# Patient Record
Sex: FEMALE | Race: WHITE | NOT HISPANIC OR LATINO | Employment: UNEMPLOYED | URBAN - METROPOLITAN AREA
[De-identification: names, ages, dates, MRNs, and addresses within clinical notes are randomized per-mention and may not be internally consistent; named-entity substitution may affect disease eponyms.]

---

## 2023-05-27 LAB
ANION GAP IN SER/PLAS: 13 MMOL/L (ref 10–20)
BASOPHILS (10*3/UL) IN BLOOD BY AUTOMATED COUNT: 0.04 X10E9/L (ref 0–0.1)
BASOPHILS/100 LEUKOCYTES IN BLOOD BY AUTOMATED COUNT: 0.5 % (ref 0–2)
CALCIUM (MG/DL) IN SER/PLAS: 9.5 MG/DL (ref 8.6–10.3)
CARBON DIOXIDE, TOTAL (MMOL/L) IN SER/PLAS: 27 MMOL/L (ref 21–32)
CHLORIDE (MMOL/L) IN SER/PLAS: 102 MMOL/L (ref 98–107)
CHOLESTEROL (MG/DL) IN SER/PLAS: 183 MG/DL (ref 0–199)
CHOLESTEROL IN HDL (MG/DL) IN SER/PLAS: 50.6 MG/DL
CHOLESTEROL/HDL RATIO: 3.6
CREATININE (MG/DL) IN SER/PLAS: 0.92 MG/DL (ref 0.5–1.05)
EOSINOPHILS (10*3/UL) IN BLOOD BY AUTOMATED COUNT: 0.13 X10E9/L (ref 0–0.7)
EOSINOPHILS/100 LEUKOCYTES IN BLOOD BY AUTOMATED COUNT: 1.6 % (ref 0–6)
ERYTHROCYTE DISTRIBUTION WIDTH (RATIO) BY AUTOMATED COUNT: 13.1 % (ref 11.5–14.5)
ERYTHROCYTE MEAN CORPUSCULAR HEMOGLOBIN CONCENTRATION (G/DL) BY AUTOMATED: 31.6 G/DL (ref 32–36)
ERYTHROCYTE MEAN CORPUSCULAR VOLUME (FL) BY AUTOMATED COUNT: 85 FL (ref 80–100)
ERYTHROCYTES (10*6/UL) IN BLOOD BY AUTOMATED COUNT: 5.03 X10E12/L (ref 4–5.2)
GFR FEMALE: 68 ML/MIN/1.73M2
GLUCOSE (MG/DL) IN SER/PLAS: 91 MG/DL (ref 74–99)
HEMATOCRIT (%) IN BLOOD BY AUTOMATED COUNT: 43 % (ref 36–46)
HEMOGLOBIN (G/DL) IN BLOOD: 13.6 G/DL (ref 12–16)
IMMATURE GRANULOCYTES/100 LEUKOCYTES IN BLOOD BY AUTOMATED COUNT: 0.3 % (ref 0–0.9)
LDL: 116 MG/DL (ref 0–99)
LEUKOCYTES (10*3/UL) IN BLOOD BY AUTOMATED COUNT: 7.9 X10E9/L (ref 4.4–11.3)
LYMPHOCYTES (10*3/UL) IN BLOOD BY AUTOMATED COUNT: 2.15 X10E9/L (ref 1.2–4.8)
LYMPHOCYTES/100 LEUKOCYTES IN BLOOD BY AUTOMATED COUNT: 27.3 % (ref 13–44)
MONOCYTES (10*3/UL) IN BLOOD BY AUTOMATED COUNT: 0.41 X10E9/L (ref 0.1–1)
MONOCYTES/100 LEUKOCYTES IN BLOOD BY AUTOMATED COUNT: 5.2 % (ref 2–10)
NEUTROPHILS (10*3/UL) IN BLOOD BY AUTOMATED COUNT: 5.13 X10E9/L (ref 1.2–7.7)
NEUTROPHILS/100 LEUKOCYTES IN BLOOD BY AUTOMATED COUNT: 65.1 % (ref 40–80)
NRBC (PER 100 WBCS) BY AUTOMATED COUNT: 0 /100 WBC (ref 0–0)
PLATELETS (10*3/UL) IN BLOOD AUTOMATED COUNT: 345 X10E9/L (ref 150–450)
POTASSIUM (MMOL/L) IN SER/PLAS: 4.4 MMOL/L (ref 3.5–5.3)
SODIUM (MMOL/L) IN SER/PLAS: 138 MMOL/L (ref 136–145)
TRIGLYCERIDE (MG/DL) IN SER/PLAS: 83 MG/DL (ref 0–149)
UREA NITROGEN (MG/DL) IN SER/PLAS: 8 MG/DL (ref 6–23)
VLDL: 17 MG/DL (ref 0–40)

## 2024-05-29 ENCOUNTER — HOSPITAL ENCOUNTER (OUTPATIENT)
Dept: RADIOLOGY | Facility: CLINIC | Age: 69
End: 2024-05-29
Payer: MEDICAID

## 2024-05-30 ENCOUNTER — HOSPITAL ENCOUNTER (OUTPATIENT)
Dept: RADIOLOGY | Facility: CLINIC | Age: 69
Discharge: HOME | End: 2024-05-30
Payer: COMMERCIAL

## 2024-05-30 VITALS — BODY MASS INDEX: 40.97 KG/M2 | HEIGHT: 64 IN | WEIGHT: 240 LBS

## 2024-05-30 DIAGNOSIS — Z12.31 ENCOUNTER FOR SCREENING MAMMOGRAM FOR MALIGNANT NEOPLASM OF BREAST: ICD-10-CM

## 2024-05-30 PROCEDURE — 77067 SCR MAMMO BI INCL CAD: CPT | Performed by: RADIOLOGY

## 2024-05-30 PROCEDURE — 77063 BREAST TOMOSYNTHESIS BI: CPT | Performed by: RADIOLOGY

## 2024-05-30 PROCEDURE — 77067 SCR MAMMO BI INCL CAD: CPT

## 2024-09-20 ENCOUNTER — PREP FOR PROCEDURE (OUTPATIENT)
Dept: SURGERY | Facility: HOSPITAL | Age: 69
End: 2024-09-20
Payer: COMMERCIAL

## 2024-09-20 DIAGNOSIS — M17.10 ARTHRITIS OF KNEE: Primary | ICD-10-CM

## 2024-09-20 RX ORDER — SODIUM CHLORIDE, SODIUM LACTATE, POTASSIUM CHLORIDE, CALCIUM CHLORIDE 600; 310; 30; 20 MG/100ML; MG/100ML; MG/100ML; MG/100ML
20 INJECTION, SOLUTION INTRAVENOUS CONTINUOUS
OUTPATIENT
Start: 2024-09-20

## 2024-09-25 ENCOUNTER — PRE-ADMISSION TESTING (OUTPATIENT)
Dept: PREADMISSION TESTING | Facility: HOSPITAL | Age: 69
End: 2024-09-25
Payer: COMMERCIAL

## 2024-09-25 VITALS
HEIGHT: 64 IN | SYSTOLIC BLOOD PRESSURE: 160 MMHG | BODY MASS INDEX: 41.4 KG/M2 | DIASTOLIC BLOOD PRESSURE: 77 MMHG | OXYGEN SATURATION: 97 % | RESPIRATION RATE: 18 BRPM | TEMPERATURE: 95 F | WEIGHT: 242.51 LBS | HEART RATE: 67 BPM

## 2024-09-25 DIAGNOSIS — M17.10 ARTHRITIS OF KNEE: ICD-10-CM

## 2024-09-25 DIAGNOSIS — Z01.818 PREOP TESTING: Primary | ICD-10-CM

## 2024-09-25 LAB
ABO GROUP (TYPE) IN BLOOD: NORMAL
ABO GROUP (TYPE) IN BLOOD: NORMAL
ALBUMIN SERPL BCP-MCNC: 4.2 G/DL (ref 3.4–5)
ALP SERPL-CCNC: 80 U/L (ref 33–136)
ALT SERPL W P-5'-P-CCNC: 13 U/L (ref 7–45)
ANION GAP SERPL CALC-SCNC: 11 MMOL/L (ref 10–20)
ANTIBODY SCREEN: NORMAL
APTT PPP: 35 SECONDS (ref 27–38)
AST SERPL W P-5'-P-CCNC: 16 U/L (ref 9–39)
BILIRUB SERPL-MCNC: 0.9 MG/DL (ref 0–1.2)
BUN SERPL-MCNC: 13 MG/DL (ref 6–23)
CALCIUM SERPL-MCNC: 9.8 MG/DL (ref 8.6–10.3)
CHLORIDE SERPL-SCNC: 104 MMOL/L (ref 98–107)
CO2 SERPL-SCNC: 29 MMOL/L (ref 21–32)
CREAT SERPL-MCNC: 0.89 MG/DL (ref 0.5–1.05)
EGFRCR SERPLBLD CKD-EPI 2021: 71 ML/MIN/1.73M*2
ERYTHROCYTE [DISTWIDTH] IN BLOOD BY AUTOMATED COUNT: 13.1 % (ref 11.5–14.5)
GLUCOSE SERPL-MCNC: 103 MG/DL (ref 74–99)
HCT VFR BLD AUTO: 41.8 % (ref 36–46)
HGB BLD-MCNC: 13.5 G/DL (ref 12–16)
INR PPP: 0.9 (ref 0.9–1.1)
MCH RBC QN AUTO: 28.1 PG (ref 26–34)
MCHC RBC AUTO-ENTMCNC: 32.3 G/DL (ref 32–36)
MCV RBC AUTO: 87 FL (ref 80–100)
NRBC BLD-RTO: 0 /100 WBCS (ref 0–0)
PLATELET # BLD AUTO: 316 X10*3/UL (ref 150–450)
POTASSIUM SERPL-SCNC: 5.2 MMOL/L (ref 3.5–5.3)
PROT SERPL-MCNC: 7.1 G/DL (ref 6.4–8.2)
PROTHROMBIN TIME: 10.3 SECONDS (ref 9.8–12.8)
RBC # BLD AUTO: 4.8 X10*6/UL (ref 4–5.2)
RH FACTOR (ANTIGEN D): NORMAL
RH FACTOR (ANTIGEN D): NORMAL
SODIUM SERPL-SCNC: 139 MMOL/L (ref 136–145)
WBC # BLD AUTO: 7.8 X10*3/UL (ref 4.4–11.3)

## 2024-09-25 PROCEDURE — 93005 ELECTROCARDIOGRAM TRACING: CPT

## 2024-09-25 PROCEDURE — 99204 OFFICE O/P NEW MOD 45 MIN: CPT | Performed by: NURSE PRACTITIONER

## 2024-09-25 PROCEDURE — 36415 COLL VENOUS BLD VENIPUNCTURE: CPT

## 2024-09-25 PROCEDURE — 87081 CULTURE SCREEN ONLY: CPT | Mod: PARLAB | Performed by: NURSE PRACTITIONER

## 2024-09-25 PROCEDURE — 85610 PROTHROMBIN TIME: CPT

## 2024-09-25 PROCEDURE — 86901 BLOOD TYPING SEROLOGIC RH(D): CPT | Performed by: INTERNAL MEDICINE

## 2024-09-25 PROCEDURE — 85027 COMPLETE CBC AUTOMATED: CPT

## 2024-09-25 PROCEDURE — 93010 ELECTROCARDIOGRAM REPORT: CPT | Performed by: INTERNAL MEDICINE

## 2024-09-25 PROCEDURE — 80053 COMPREHEN METABOLIC PANEL: CPT

## 2024-09-25 RX ORDER — MECLIZINE HYDROCHLORIDE 25 MG/1
25 TABLET ORAL 3 TIMES DAILY PRN
COMMUNITY

## 2024-09-25 RX ORDER — CHLORHEXIDINE GLUCONATE ORAL RINSE 1.2 MG/ML
15 SOLUTION DENTAL AS NEEDED
Qty: 120 ML | Refills: 0 | Status: SHIPPED | OUTPATIENT
Start: 2024-09-25

## 2024-09-25 RX ORDER — CHLORHEXIDINE GLUCONATE 40 MG/ML
SOLUTION TOPICAL DAILY
Qty: 118 ML | Refills: 0 | Status: SHIPPED | OUTPATIENT
Start: 2024-09-25 | End: 2024-09-30

## 2024-09-25 RX ORDER — CALCITRIOL 0.25 UG/1
0.25 CAPSULE ORAL DAILY
COMMUNITY

## 2024-09-25 ASSESSMENT — DUKE ACTIVITY SCORE INDEX (DASI)
CAN YOU DO MODERATE WORK AROUND THE HOUSE LIKE VACUUMING, SWEEPING FLOORS OR CARRYING GROCERIES: YES
CAN YOU TAKE CARE OF YOURSELF (EAT, DRESS, BATHE, OR USE TOILET): YES
CAN YOU PARTICIPATE IN STRENOUS SPORTS LIKE SWIMMING, SINGLES TENNIS, FOOTBALL, BASKETBALL, OR SKIING: NO
CAN YOU CLIMB A FLIGHT OF STAIRS OR WALK UP A HILL: YES
CAN YOU DO HEAVY WORK AROUND THE HOUSE LIKE SCRUBBING FLOORS OR LIFTING AND MOVING HEAVY FURNITURE: NO
CAN YOU DO YARD WORK LIKE RAKING LEAVES, WEEDING OR PUSHING A MOWER: NO
CAN YOU HAVE SEXUAL RELATIONS: NO
CAN YOU WALK INDOORS, SUCH AS AROUND YOUR HOUSE: YES
CAN YOU PARTICIPATE IN MODERATE RECREATIONAL ACTIVITIES LIKE GOLF, BOWLING, DANCING, DOUBLES TENNIS OR THROWING A BASEBALL OR FOOTBALL: NO
CAN YOU DO LIGHT WORK AROUND THE HOUSE LIKE DUSTING OR WASHING DISHES: YES

## 2024-09-25 NOTE — PREPROCEDURE INSTRUCTIONS
Medication List            Accurate as of September 25, 2024  8:21 AM. Always use your most recent med list.                calcitriol 0.25 mcg capsule  Commonly known as: Rocaltrol  Additional Medication Adjustments for Surgery: Take last dose 7 days before surgery  Notes to patient: Do not take after 9/30     calcium citrate-vitamin D2 250 mg-2.5 mcg (100 unit) tablet  Additional Medication Adjustments for Surgery: Take last dose 7 days before surgery  Notes to patient: Do not take after 9/30     meclizine 25 mg tablet  Commonly known as: Antivert  Medication Adjustments for Surgery: Take/Use as prescribed            PRE-OPERATIVE INSTRUCTIONS FOR SURGERY    *Do not eat anything after midnight the night of surgery.  This includes food of any kind (including hard candy, cough drops, mints).   You may have up to 13.5 ounces of clear liquid  until TWO hours prior to your arrival time to the hospital unless ERAS* protocol is ordered for you.  Clear liquids include water, black tea/coffee, (no milk or cream) apple juice and electrolyte drinks (GATORADE).  You may chew gum until TWO hours prior you your surgery/procedure.     *ERAS protocol: follow as instructed.  DO not drink an additional 13.5 ounces as noted above.        *One of our staff members will call you ONE business day before your surgery, between 11 am-2 pm to let you know the time to arrive.  If you have not received a call by 2 pm, call [phone]  *When you arrive at the hospital-->GO TO Registration on the ground floor  *Stop smoking 24 hours prior to surgery.  No Marijuana, CBD Oil or Vaping for 48 hours  *No alcohol 24 hours prior to surgery  *You will need a responsible adult to drive you home  -No acrylic nails or nail polish on at least one fingernail, NO polish on toes for foot surgery  -You may be asked to remove your dentures, partial plate, eyeglasses or contact lenses before going to surgery.  Please bring a case for these items.  -Body  piercings need to be removed.  Jewelry and valuables should be left at home.  -Put on loose,  comfortable, clean clothing, that will accommodate bandages        What is a home antibacterial shower?  START SHOWERS 10/4  This shower is a way of cleaning the skin with a germ killing solution before surgery.  The solution contains chlorhexidine, commonly known as CHG.  CHG is a skin cleanser with germ killing ability.  Let your doctor know if you are allergic to chlorhexidine.    Why do I need to take a preoperative antibacterial shower?  Skin is not sterile.  It is best to try to make your skin as free of germs as possible before surgery.  Proper cleansing with a germ killing soap before surgery can lower the number of germs on your skin.  This helps to reduce the risk of infection at the surgical site.  Following the instructions listed below will help you prepare your skin for surgery.      How do I use the solution?    Steps: Begin using your CHG soap 5 days before your surgery on _10/8/2024_________________.    *First, wash and rinse your hair using the CHG soap.  Keep CHG soap away from ear canals and eyes.   Rinse completely, do not condition.  Hair extensions should be removed.    *Wash your face with your normal soap and rinse.   *Apply the CHG solution to a clean wet washcloth.  Turn the water off or move away from the water spray to avoid premature rinsing of the CHG soap as you are applying.  Firmly lather your entire body from the neck down.  Do not use on your face.    *Pay special attention to the area(s) where your incision(s) will be located unless they are on your face.  Avoid scrubbing your skin too hard.  The important part is to have the CHG soap sit on your skin for 3 minutes.   *When the 3 minutes are up, turn on the water and rinse the CHG solution off your body completely.  *Do not wash with regular soap after you  have  used the CHG soap solution.  *Pat  yourself dry with a clean, freshly  laundered towel.  *Do not apply powders, deodorants or lotions.  *Dress in clean freshly laundered night clothes.    *Be sure to sleep with clean freshly laundered sheets.    *Be aware the CHG will cause stains on fabrics; if you wash them with bleach after use.  Rinse your washcloth and other linens that have contact with CHG completely.  Use only non-chlorine detergents to launder the items  used.  *The morning of surgery is the fifth day.  Repeat the above steps and dress in clean comfortable clothing.     What is oral/dental rinse?  It is mouthwash.  It is a way of cleaning the he mouth with a germ-killing solution before your surgery.  The solution contains chlorhexidine, commonly known as CHG.  It is used inside the mouth to kill a bacteria known as Staphylococcus aureus.  Let your doctor know if you are allergic to Chlorhexidine.    Why do I need to use CHG oral/ dental rinse?  The CHG oral/dental rinse helps to kill bacteria in your mouth know as Staphylococcus aureus.  This reduces the risk of infection at the surgical site.    Using your CHG oral/dental rinse    STEPS:    Use your CHG oral/dental rinse after you brush your teeth the night before (at bedtime) and the morning of your surgery.  Follow all the directions on your prescription label.  *Use the cap on the container to measure 15 ml  *Swish (gargle if you can) the mouthwash in your mouth for at least 30 seconds, (do not swallow) and spit out  *After you use your CHG rinse, do not rinse your mouth with water, drink or eat.  Please refer to the prescription label for the appropriate time to resume oral intake.    What side effects might I have using the CHG oral/dental rinse?  CHG rinse will stick you plaque on the teeth.  Brush and floss just before use.   Teeth brushing will help avoid staining of the plaque during  use.  Teeth brushing will help avoid staining of plaque during  use.    Who should I contact if I have questions about the CHG  oral/dental rinse and or CHG soap?  Please call [facility], Pre-Admission testing at [phone] if you have any questions.    What you may be asked to bring to surgery:  ___Crutches, walker  ___CPAP machine  ___Urine specimen

## 2024-09-25 NOTE — H&P (VIEW-ONLY)
"CPM/PAT Evaluation       Name: Diane Fleischer (Diane Fleischer)  /Age: 1955/ y.o.     In-Person       Chief Complaint:     68 yr old female presents to PAT for pre-operative evaluation, with c/o knee pain  RIGHT TOTAL KNEE REPLACEMENT is Scheduled on 10/8/2024 with Dr. Zamora    The patient has the following past medical history:   RA, thyroid disease, BPPV    Chief complaint:  She c/o right knee pain d/t arthritis, She states the \"right knee gives and left pops\"  She reports that she fell at work in , and had an arthroscopy to her right knee in the past  States she re-injured her right knee in  d/t another fall at work causing worsening pain and chronic swelling.   She takes tylenol for pain, as needed    PCP - Dr. Bourgeois, LOV 2024  She reports \"he is keeping an eye on her thyroid levels.\"    She c/o vertigo d/t a pinched nerve in her neck.  Takes medication and last taken 3 months ago    Denies fever, chills or nausea.   Denies any past issues with anesthesia.        Vitals:    24 0744   BP: 160/77   Pulse: 67   Resp: 18   Temp: 35 °C (95 °F)   SpO2: 97%          Past Medical History:   Diagnosis Date    Arthritis     Disease of thyroid gland        No past surgical history on file.    Past Surgical History   Procedure Laterality Date    Arthroplasty, total knee, revision     Carpal tunnel surgery.     Patient  has no history on file for sexual activity.    Family History   Problem Relation Name Age of Onset    Breast cancer Other Mat Aunt        No Known Allergies    Prior to Admission medications    Medication Sig Start Date End Date Taking? Authorizing Provider   calcitriol (Rocaltrol) 0.25 mcg capsule Take 1 capsule (0.25 mcg) by mouth once daily.   Yes Historical Provider, MD   calcium citrate-vitamin D2 250 mg-2.5 mcg (100 unit) tablet Take 1 tablet by mouth 2 times a day.   Yes Historical Provider, MD   meclizine (Antivert) 25 mg tablet Take 1 tablet (25 mg) by mouth 3 times a " day as needed for dizziness.    Historical Provider, MD      Review of Systems  Constitutional: NO F, chills, or sweats  Eyes: glasses, no blurred vision or visual disturbance  ENT: denies congestion, sore throat, difficulty hearing  Cardiovascular: no chest pain, no edema, no palps and no syncope.   Respiratory: COLÓN, sleeps propped up, no cough,no s.o.b. and no wheezing  Gastrointestinal: no abdominal pain, no N/V, no blood in stools  Genitourinary: LMP- in her 40's, urge/stress incontinence, nocturia, no dysuria, no urinary frequency, no urinary hesitancy and no feelings of urinary urgency.   Musculoskeletal: see HPI, no arthralgias,  no back pain and no myalgias.   Integumentary: no new skin lesions and no rashes.   Neurological: + difficulty walking, no headache, no limb weakness, no numbness and no tingling.   Endocrine: no recent weight gain and no recent weight loss.   Hematologic/Lymphatic: no tendency for easy bruising and no swollen glands.      Physical Exam  Constitutional:       Appearance: Normal appearance.   Cardiovascular:      Rate and Rhythm: Normal rate.      Heart sounds: Normal heart sounds.   Pulmonary:      Effort: Pulmonary effort is normal.      Breath sounds: Normal breath sounds.   Abdominal:      General: Bowel sounds are normal.      Palpations: Abdomen is soft.   Musculoskeletal:      Cervical back: Normal range of motion.      Right knee: Bony tenderness present. Decreased range of motion.      Right lower leg: Edema present.      Left lower leg: Edema present.      Comments: + antalgic gait   Skin:     General: Skin is warm and dry.   Neurological:      Mental Status: She is alert and oriented to person, place, and time.          PAT AIRWAY:   Airway:     Mallampati::  III    TM distance::  <3 FB   Partial for upper front,  Poor dentition   partials      Visit Vitals  /77   Pulse 67   Temp 35 °C (95 °F) (Temporal)   Resp 18       DASI Risk Score    No data to display        Caprini DVT Assessment    No data to display       Modified Frailty Index    No data to display       CHADS2 Stroke Risk  Current as of 16 minutes ago        N/A 3 to 100%: High Risk   2 to < 3%: Medium Risk   0 to < 2%: Low Risk     Last Change: N/A          This score determines the patient's risk of having a stroke if the patient has atrial fibrillation.        This score is not applicable to this patient. Components are not calculated.          Revised Cardiac Risk Index    No data to display       Apfel Simplified Score    No data to display       Risk Analysis Index Results This Encounter    No data found in the last 1 encounters.       Stop Bang Score      Flowsheet Row Most Recent Value   Do you snore loudly? 0   Do you often feel tired or fatigued after your sleep? 0   Has anyone ever observed you stop breathing in your sleep? 0   Do you have or are you being treated for high blood pressure? 0   Recent BMI (Calculated) 41.6   Is BMI greater than 35 kg/m2? 1=Yes   Age older than 50 years old? 1=Yes   Is your neck circumference greater than 17 inches (Male) or 16 inches (Female)? 1   Gender - Male 0=No   STOP-BANG Total Score 3            ASSESSMENT  Obesity BMI 41.63    BPPV-  Followed Neuro at [facility] in the past  Takes Meclizine PRN, last taken 3 months ago    RA-  Takes Tylenol PRN  Plan for RTK with Dr. Zamora      ECG 9/25/2024- NSR, 60 bpm           ANESTHESIA FINDINGS:  Intubation History: No history of difficult intubation  Significant Anesthesia Considerations:      Airway History: No abnormal airway history  Predictors of Difficult Airway Management  ? Obesity        CONSULTS:    Patient does not require consults for optimization at this time.     The Following Tests/Procedures Have Been Initiated and Reviewed/ interpreted by me:   CBC, BMP, Coag screen, MRSA screen, ECG     Planned Anesthetic: general     Instructions Given to Patient:  *Reviewed Medications to be taken in AM of surgery or  held  Patient given verbal and written preop instructions and voices comprehension and compliance.     No further testing required.      PLAN  This patient is optimally prepared for surgery.

## 2024-09-26 LAB
ATRIAL RATE: 60 BPM
P AXIS: 40 DEGREES
P OFFSET: 200 MS
P ONSET: 149 MS
PR INTERVAL: 150 MS
Q ONSET: 224 MS
QRS COUNT: 10 BEATS
QRS DURATION: 74 MS
QT INTERVAL: 410 MS
QTC CALCULATION(BAZETT): 410 MS
QTC FREDERICIA: 410 MS
R AXIS: -23 DEGREES
STAPHYLOCOCCUS SPEC CULT: ABNORMAL
T AXIS: 20 DEGREES
T OFFSET: 429 MS
VENTRICULAR RATE: 60 BPM

## 2024-10-08 ENCOUNTER — ANESTHESIA (OUTPATIENT)
Dept: OPERATING ROOM | Facility: HOSPITAL | Age: 69
End: 2024-10-08
Payer: COMMERCIAL

## 2024-10-08 ENCOUNTER — ANESTHESIA EVENT (OUTPATIENT)
Dept: OPERATING ROOM | Facility: HOSPITAL | Age: 69
End: 2024-10-08
Payer: COMMERCIAL

## 2024-10-08 ENCOUNTER — HOSPITAL ENCOUNTER (OUTPATIENT)
Facility: HOSPITAL | Age: 69
LOS: 1 days | Discharge: HOME HEALTH | End: 2024-10-09
Attending: ORTHOPAEDIC SURGERY | Admitting: ORTHOPAEDIC SURGERY
Payer: COMMERCIAL

## 2024-10-08 DIAGNOSIS — M17.10 ARTHRITIS OF KNEE: Primary | ICD-10-CM

## 2024-10-08 DIAGNOSIS — M17.11 ARTHRITIS OF RIGHT KNEE: ICD-10-CM

## 2024-10-08 DIAGNOSIS — Z01.818 PRE-OP TESTING: ICD-10-CM

## 2024-10-08 PROCEDURE — 2500000001 HC RX 250 WO HCPCS SELF ADMINISTERED DRUGS (ALT 637 FOR MEDICARE OP): Performed by: ANESTHESIOLOGY

## 2024-10-08 PROCEDURE — 7100000011 HC EXTENDED STAY RECOVERY HOURLY - NURSING UNIT

## 2024-10-08 PROCEDURE — 87081 CULTURE SCREEN ONLY: CPT | Mod: PARLAB | Performed by: NURSE PRACTITIONER

## 2024-10-08 PROCEDURE — 2500000004 HC RX 250 GENERAL PHARMACY W/ HCPCS (ALT 636 FOR OP/ED): Performed by: ORTHOPAEDIC SURGERY

## 2024-10-08 PROCEDURE — 3700000002 HC GENERAL ANESTHESIA TIME - EACH INCREMENTAL 1 MINUTE: Performed by: ORTHOPAEDIC SURGERY

## 2024-10-08 PROCEDURE — 88311 DECALCIFY TISSUE: CPT | Mod: TC,PARLAB | Performed by: ORTHOPAEDIC SURGERY

## 2024-10-08 PROCEDURE — 2500000005 HC RX 250 GENERAL PHARMACY W/O HCPCS: Performed by: ANESTHESIOLOGIST ASSISTANT

## 2024-10-08 PROCEDURE — 2500000005 HC RX 250 GENERAL PHARMACY W/O HCPCS: Performed by: INTERNAL MEDICINE

## 2024-10-08 PROCEDURE — 2720000007 HC OR 272 NO HCPCS: Performed by: ORTHOPAEDIC SURGERY

## 2024-10-08 PROCEDURE — 97165 OT EVAL LOW COMPLEX 30 MIN: CPT | Mod: GO

## 2024-10-08 PROCEDURE — 2500000001 HC RX 250 WO HCPCS SELF ADMINISTERED DRUGS (ALT 637 FOR MEDICARE OP): Performed by: ORTHOPAEDIC SURGERY

## 2024-10-08 PROCEDURE — 2780000003 HC OR 278 NO HCPCS: Performed by: ORTHOPAEDIC SURGERY

## 2024-10-08 PROCEDURE — 2500000004 HC RX 250 GENERAL PHARMACY W/ HCPCS (ALT 636 FOR OP/ED): Performed by: ANESTHESIOLOGY

## 2024-10-08 PROCEDURE — 2500000004 HC RX 250 GENERAL PHARMACY W/ HCPCS (ALT 636 FOR OP/ED): Performed by: INTERNAL MEDICINE

## 2024-10-08 PROCEDURE — 2500000004 HC RX 250 GENERAL PHARMACY W/ HCPCS (ALT 636 FOR OP/ED): Performed by: ANESTHESIOLOGIST ASSISTANT

## 2024-10-08 PROCEDURE — 97161 PT EVAL LOW COMPLEX 20 MIN: CPT | Mod: GP

## 2024-10-08 PROCEDURE — 3700000001 HC GENERAL ANESTHESIA TIME - INITIAL BASE CHARGE: Performed by: ORTHOPAEDIC SURGERY

## 2024-10-08 PROCEDURE — C1713 ANCHOR/SCREW BN/BN,TIS/BN: HCPCS | Performed by: ORTHOPAEDIC SURGERY

## 2024-10-08 PROCEDURE — 3600000018 HC OR TIME - INITIAL BASE CHARGE - PROCEDURE LEVEL SIX: Performed by: ORTHOPAEDIC SURGERY

## 2024-10-08 PROCEDURE — 3600000017 HC OR TIME - EACH INCREMENTAL 1 MINUTE - PROCEDURE LEVEL SIX: Performed by: ORTHOPAEDIC SURGERY

## 2024-10-08 PROCEDURE — 2500000005 HC RX 250 GENERAL PHARMACY W/O HCPCS: Performed by: ORTHOPAEDIC SURGERY

## 2024-10-08 PROCEDURE — C1776 JOINT DEVICE (IMPLANTABLE): HCPCS | Performed by: ORTHOPAEDIC SURGERY

## 2024-10-08 PROCEDURE — 7100000001 HC RECOVERY ROOM TIME - INITIAL BASE CHARGE: Performed by: ORTHOPAEDIC SURGERY

## 2024-10-08 PROCEDURE — 7100000002 HC RECOVERY ROOM TIME - EACH INCREMENTAL 1 MINUTE: Performed by: ORTHOPAEDIC SURGERY

## 2024-10-08 DEVICE — P.F.C. SIGMA TIBIAL TRAY FIXED BEARING MODULAR COCR 3 CEMENTED
Type: IMPLANTABLE DEVICE | Site: KNEE | Status: FUNCTIONAL
Brand: P.F.C. SIGMA

## 2024-10-08 DEVICE — P.F.C. SIGMA OVAL DOME PATELLA 3-PEG 35MM CEMENTED
Type: IMPLANTABLE DEVICE | Site: KNEE | Status: FUNCTIONAL
Brand: P.F.C. SIGMA

## 2024-10-08 DEVICE — SIGMA FEMORAL POSTERIOR STABILIZED CEMENTED SIZE 3 RIGHT
Type: IMPLANTABLE DEVICE | Site: KNEE | Status: FUNCTIONAL
Brand: SIGMA

## 2024-10-08 DEVICE — P.F.C. SIGMA TIBIAL INSERT FIXED BEARING STABILIZED 3 10MM XLK
Type: IMPLANTABLE DEVICE | Site: KNEE | Status: FUNCTIONAL
Brand: P.F.C. SIGMA

## 2024-10-08 RX ORDER — CEFAZOLIN SODIUM 2 G/100ML
2 INJECTION, SOLUTION INTRAVENOUS EVERY 8 HOURS
Status: COMPLETED | OUTPATIENT
Start: 2024-10-08 | End: 2024-10-09

## 2024-10-08 RX ORDER — ACETAMINOPHEN 325 MG/1
650 TABLET ORAL ONCE
Status: COMPLETED | OUTPATIENT
Start: 2024-10-08 | End: 2024-10-08

## 2024-10-08 RX ORDER — CEFAZOLIN SODIUM 2 G/100ML
2 INJECTION, SOLUTION INTRAVENOUS EVERY 8 HOURS
Status: DISCONTINUED | OUTPATIENT
Start: 2024-10-08 | End: 2024-10-08 | Stop reason: HOSPADM

## 2024-10-08 RX ORDER — MECLIZINE HYDROCHLORIDE 25 MG/1
25 TABLET ORAL 3 TIMES DAILY PRN
Status: DISCONTINUED | OUTPATIENT
Start: 2024-10-08 | End: 2024-10-09 | Stop reason: HOSPADM

## 2024-10-08 RX ORDER — HYDROMORPHONE HYDROCHLORIDE 1 MG/ML
1 INJECTION, SOLUTION INTRAMUSCULAR; INTRAVENOUS; SUBCUTANEOUS EVERY 5 MIN PRN
Status: DISCONTINUED | OUTPATIENT
Start: 2024-10-08 | End: 2024-10-08 | Stop reason: HOSPADM

## 2024-10-08 RX ORDER — KETOROLAC TROMETHAMINE 30 MG/ML
15 INJECTION, SOLUTION INTRAMUSCULAR; INTRAVENOUS EVERY 8 HOURS
Status: CANCELLED | OUTPATIENT
Start: 2024-10-08 | End: 2024-10-09

## 2024-10-08 RX ORDER — CELECOXIB 100 MG/1
100 CAPSULE ORAL ONCE
Status: COMPLETED | OUTPATIENT
Start: 2024-10-08 | End: 2024-10-08

## 2024-10-08 RX ORDER — ONDANSETRON 4 MG/1
4 TABLET, FILM COATED ORAL EVERY 8 HOURS PRN
Status: DISCONTINUED | OUTPATIENT
Start: 2024-10-08 | End: 2024-10-09 | Stop reason: HOSPADM

## 2024-10-08 RX ORDER — ACETAMINOPHEN 325 MG/1
650 TABLET ORAL EVERY 4 HOURS PRN
Status: DISCONTINUED | OUTPATIENT
Start: 2024-10-08 | End: 2024-10-08 | Stop reason: HOSPADM

## 2024-10-08 RX ORDER — PROPOFOL 10 MG/ML
INJECTION, EMULSION INTRAVENOUS CONTINUOUS PRN
Status: DISCONTINUED | OUTPATIENT
Start: 2024-10-08 | End: 2024-10-08

## 2024-10-08 RX ORDER — SODIUM CHLORIDE, SODIUM LACTATE, POTASSIUM CHLORIDE, CALCIUM CHLORIDE 600; 310; 30; 20 MG/100ML; MG/100ML; MG/100ML; MG/100ML
75 INJECTION, SOLUTION INTRAVENOUS CONTINUOUS
Status: ACTIVE | OUTPATIENT
Start: 2024-10-08 | End: 2024-10-09

## 2024-10-08 RX ORDER — TRANEXAMIC ACID 10 MG/ML
1000 INJECTION, SOLUTION INTRAVENOUS ONCE
Status: DISCONTINUED | OUTPATIENT
Start: 2024-10-08 | End: 2024-10-08 | Stop reason: HOSPADM

## 2024-10-08 RX ORDER — LABETALOL HYDROCHLORIDE 5 MG/ML
5 INJECTION, SOLUTION INTRAVENOUS ONCE AS NEEDED
Status: DISCONTINUED | OUTPATIENT
Start: 2024-10-08 | End: 2024-10-08 | Stop reason: HOSPADM

## 2024-10-08 RX ORDER — PHENYLEPHRINE HCL IN 0.9% NACL 1 MG/10 ML
SYRINGE (ML) INTRAVENOUS AS NEEDED
Status: DISCONTINUED | OUTPATIENT
Start: 2024-10-08 | End: 2024-10-08

## 2024-10-08 RX ORDER — FENTANYL CITRATE 50 UG/ML
INJECTION, SOLUTION INTRAMUSCULAR; INTRAVENOUS AS NEEDED
Status: DISCONTINUED | OUTPATIENT
Start: 2024-10-08 | End: 2024-10-08

## 2024-10-08 RX ORDER — LIDOCAINE HCL/PF 100 MG/5ML
SYRINGE (ML) INTRAVENOUS AS NEEDED
Status: DISCONTINUED | OUTPATIENT
Start: 2024-10-08 | End: 2024-10-08

## 2024-10-08 RX ORDER — MEPERIDINE HYDROCHLORIDE 25 MG/ML
12.5 INJECTION INTRAMUSCULAR; INTRAVENOUS; SUBCUTANEOUS EVERY 10 MIN PRN
Status: DISCONTINUED | OUTPATIENT
Start: 2024-10-08 | End: 2024-10-08 | Stop reason: HOSPADM

## 2024-10-08 RX ORDER — OXYCODONE HYDROCHLORIDE 5 MG/1
10 TABLET ORAL EVERY 4 HOURS PRN
Status: DISCONTINUED | OUTPATIENT
Start: 2024-10-08 | End: 2024-10-09 | Stop reason: HOSPADM

## 2024-10-08 RX ORDER — GLYCOPYRROLATE 0.2 MG/ML
INJECTION INTRAMUSCULAR; INTRAVENOUS AS NEEDED
Status: DISCONTINUED | OUTPATIENT
Start: 2024-10-08 | End: 2024-10-08

## 2024-10-08 RX ORDER — ACETAMINOPHEN 325 MG/1
650 TABLET ORAL EVERY 6 HOURS SCHEDULED
Status: DISCONTINUED | OUTPATIENT
Start: 2024-10-08 | End: 2024-10-09 | Stop reason: HOSPADM

## 2024-10-08 RX ORDER — DIPHENHYDRAMINE HYDROCHLORIDE 50 MG/ML
12.5 INJECTION INTRAMUSCULAR; INTRAVENOUS ONCE AS NEEDED
Status: DISCONTINUED | OUTPATIENT
Start: 2024-10-08 | End: 2024-10-08 | Stop reason: HOSPADM

## 2024-10-08 RX ORDER — TRANEXAMIC ACID 10 MG/ML
1000 INJECTION, SOLUTION INTRAVENOUS ONCE
Status: COMPLETED | OUTPATIENT
Start: 2024-10-08 | End: 2024-10-08

## 2024-10-08 RX ORDER — SODIUM CHLORIDE, SODIUM LACTATE, POTASSIUM CHLORIDE, CALCIUM CHLORIDE 600; 310; 30; 20 MG/100ML; MG/100ML; MG/100ML; MG/100ML
20 INJECTION, SOLUTION INTRAVENOUS CONTINUOUS
Status: DISCONTINUED | OUTPATIENT
Start: 2024-10-08 | End: 2024-10-09 | Stop reason: HOSPADM

## 2024-10-08 RX ORDER — SODIUM CHLORIDE, SODIUM LACTATE, POTASSIUM CHLORIDE, CALCIUM CHLORIDE 600; 310; 30; 20 MG/100ML; MG/100ML; MG/100ML; MG/100ML
100 INJECTION, SOLUTION INTRAVENOUS CONTINUOUS
Status: DISCONTINUED | OUTPATIENT
Start: 2024-10-08 | End: 2024-10-09 | Stop reason: HOSPADM

## 2024-10-08 RX ORDER — POLYETHYLENE GLYCOL 3350 17 G/17G
17 POWDER, FOR SOLUTION ORAL DAILY
Status: DISCONTINUED | OUTPATIENT
Start: 2024-10-08 | End: 2024-10-09 | Stop reason: HOSPADM

## 2024-10-08 RX ORDER — BUPIVACAINE HYDROCHLORIDE 7.5 MG/ML
INJECTION, SOLUTION INTRASPINAL AS NEEDED
Status: DISCONTINUED | OUTPATIENT
Start: 2024-10-08 | End: 2024-10-08

## 2024-10-08 RX ORDER — ONDANSETRON HYDROCHLORIDE 2 MG/ML
4 INJECTION, SOLUTION INTRAVENOUS EVERY 8 HOURS PRN
Status: DISCONTINUED | OUTPATIENT
Start: 2024-10-08 | End: 2024-10-09 | Stop reason: HOSPADM

## 2024-10-08 RX ORDER — NALOXONE HYDROCHLORIDE 1 MG/ML
0.2 INJECTION INTRAMUSCULAR; INTRAVENOUS; SUBCUTANEOUS EVERY 5 MIN PRN
Status: DISCONTINUED | OUTPATIENT
Start: 2024-10-08 | End: 2024-10-08 | Stop reason: SDUPTHER

## 2024-10-08 RX ORDER — MIDAZOLAM HYDROCHLORIDE 1 MG/ML
INJECTION, SOLUTION INTRAMUSCULAR; INTRAVENOUS AS NEEDED
Status: DISCONTINUED | OUTPATIENT
Start: 2024-10-08 | End: 2024-10-08

## 2024-10-08 RX ORDER — GABAPENTIN 300 MG/1
300 CAPSULE ORAL ONCE
Status: COMPLETED | OUTPATIENT
Start: 2024-10-08 | End: 2024-10-08

## 2024-10-08 RX ORDER — DEXMEDETOMIDINE HYDROCHLORIDE 100 UG/ML
INJECTION, SOLUTION INTRAVENOUS AS NEEDED
Status: DISCONTINUED | OUTPATIENT
Start: 2024-10-08 | End: 2024-10-08

## 2024-10-08 RX ORDER — NALOXONE HYDROCHLORIDE 1 MG/ML
0.2 INJECTION INTRAMUSCULAR; INTRAVENOUS; SUBCUTANEOUS EVERY 5 MIN PRN
Status: DISCONTINUED | OUTPATIENT
Start: 2024-10-08 | End: 2024-10-09 | Stop reason: HOSPADM

## 2024-10-08 RX ORDER — KETAMINE HCL IN NACL, ISO-OSM 100MG/10ML
SYRINGE (ML) INJECTION AS NEEDED
Status: DISCONTINUED | OUTPATIENT
Start: 2024-10-08 | End: 2024-10-08

## 2024-10-08 RX ORDER — MIDAZOLAM HYDROCHLORIDE 1 MG/ML
1 INJECTION, SOLUTION INTRAMUSCULAR; INTRAVENOUS ONCE AS NEEDED
Status: DISCONTINUED | OUTPATIENT
Start: 2024-10-08 | End: 2024-10-08 | Stop reason: HOSPADM

## 2024-10-08 RX ORDER — SODIUM CHLORIDE 0.9 G/100ML
IRRIGANT IRRIGATION AS NEEDED
Status: DISCONTINUED | OUTPATIENT
Start: 2024-10-08 | End: 2024-10-08 | Stop reason: HOSPADM

## 2024-10-08 RX ORDER — CALCITRIOL 0.25 UG/1
0.25 CAPSULE ORAL DAILY
Status: DISCONTINUED | OUTPATIENT
Start: 2024-10-08 | End: 2024-10-09 | Stop reason: HOSPADM

## 2024-10-08 RX ORDER — MIDAZOLAM HYDROCHLORIDE 1 MG/ML
INJECTION, SOLUTION INTRAMUSCULAR; INTRAVENOUS
Status: COMPLETED
Start: 2024-10-08 | End: 2024-10-08

## 2024-10-08 RX ORDER — MORPHINE SULFATE 4 MG/ML
4 INJECTION INTRAVENOUS EVERY 4 HOURS PRN
Status: DISCONTINUED | OUTPATIENT
Start: 2024-10-08 | End: 2024-10-09 | Stop reason: HOSPADM

## 2024-10-08 RX ORDER — HYDRALAZINE HYDROCHLORIDE 20 MG/ML
5 INJECTION INTRAMUSCULAR; INTRAVENOUS EVERY 30 MIN PRN
Status: DISCONTINUED | OUTPATIENT
Start: 2024-10-08 | End: 2024-10-08 | Stop reason: HOSPADM

## 2024-10-08 RX ORDER — ONDANSETRON HYDROCHLORIDE 2 MG/ML
4 INJECTION, SOLUTION INTRAVENOUS ONCE AS NEEDED
Status: DISCONTINUED | OUTPATIENT
Start: 2024-10-08 | End: 2024-10-08 | Stop reason: HOSPADM

## 2024-10-08 RX ORDER — OXYCODONE HYDROCHLORIDE 5 MG/1
5 TABLET ORAL EVERY 6 HOURS PRN
Status: DISCONTINUED | OUTPATIENT
Start: 2024-10-08 | End: 2024-10-09 | Stop reason: HOSPADM

## 2024-10-08 SDOH — ECONOMIC STABILITY: HOUSING INSECURITY: IN THE PAST 12 MONTHS, HOW MANY TIMES HAVE YOU MOVED WHERE YOU WERE LIVING?: 0

## 2024-10-08 SDOH — SOCIAL STABILITY: SOCIAL INSECURITY: DO YOU FEEL ANYONE HAS EXPLOITED OR TAKEN ADVANTAGE OF YOU FINANCIALLY OR OF YOUR PERSONAL PROPERTY?: NO

## 2024-10-08 SDOH — ECONOMIC STABILITY: HOUSING INSECURITY: IN THE LAST 12 MONTHS, WAS THERE A TIME WHEN YOU WERE NOT ABLE TO PAY THE MORTGAGE OR RENT ON TIME?: NO

## 2024-10-08 SDOH — ECONOMIC STABILITY: FOOD INSECURITY: WITHIN THE PAST 12 MONTHS, THE FOOD YOU BOUGHT JUST DIDN'T LAST AND YOU DIDN'T HAVE MONEY TO GET MORE.: NEVER TRUE

## 2024-10-08 SDOH — ECONOMIC STABILITY: INCOME INSECURITY: IN THE LAST 12 MONTHS, WAS THERE A TIME WHEN YOU WERE NOT ABLE TO PAY THE MORTGAGE OR RENT ON TIME?: NO

## 2024-10-08 SDOH — ECONOMIC STABILITY: FOOD INSECURITY: WITHIN THE PAST 12 MONTHS, YOU WORRIED THAT YOUR FOOD WOULD RUN OUT BEFORE YOU GOT THE MONEY TO BUY MORE.: NEVER TRUE

## 2024-10-08 SDOH — ECONOMIC STABILITY: HOUSING INSECURITY: AT ANY TIME IN THE PAST 12 MONTHS, WERE YOU HOMELESS OR LIVING IN A SHELTER (INCLUDING NOW)?: NO

## 2024-10-08 SDOH — ECONOMIC STABILITY: FOOD INSECURITY: HOW HARD IS IT FOR YOU TO PAY FOR THE VERY BASICS LIKE FOOD, HOUSING, MEDICAL CARE, AND HEATING?: NOT HARD AT ALL

## 2024-10-08 SDOH — ECONOMIC STABILITY: INCOME INSECURITY: IN THE PAST 12 MONTHS HAS THE ELECTRIC, GAS, OIL, OR WATER COMPANY THREATENED TO SHUT OFF SERVICES IN YOUR HOME?: NO

## 2024-10-08 SDOH — ECONOMIC STABILITY: TRANSPORTATION INSECURITY
IN THE PAST 12 MONTHS, HAS LACK OF TRANSPORTATION KEPT YOU FROM MEETINGS, WORK, OR FROM GETTING THINGS NEEDED FOR DAILY LIVING?: NO

## 2024-10-08 SDOH — SOCIAL STABILITY: SOCIAL INSECURITY: HAVE YOU HAD ANY THOUGHTS OF HARMING ANYONE ELSE?: NO

## 2024-10-08 SDOH — ECONOMIC STABILITY: FOOD INSECURITY: WITHIN THE PAST 12 MONTHS, YOU WORRIED THAT YOUR FOOD WOULD RUN OUT BEFORE YOU GOT MONEY TO BUY MORE.: NEVER TRUE

## 2024-10-08 SDOH — ECONOMIC STABILITY: TRANSPORTATION INSECURITY
IN THE PAST 12 MONTHS, HAS THE LACK OF TRANSPORTATION KEPT YOU FROM MEDICAL APPOINTMENTS OR FROM GETTING MEDICATIONS?: NO

## 2024-10-08 SDOH — SOCIAL STABILITY: SOCIAL INSECURITY: ARE YOU OR HAVE YOU BEEN THREATENED OR ABUSED PHYSICALLY, EMOTIONALLY, OR SEXUALLY BY ANYONE?: NO

## 2024-10-08 SDOH — SOCIAL STABILITY: SOCIAL INSECURITY: ARE THERE ANY APPARENT SIGNS OF INJURIES/BEHAVIORS THAT COULD BE RELATED TO ABUSE/NEGLECT?: NO

## 2024-10-08 SDOH — ECONOMIC STABILITY: INCOME INSECURITY: IN THE PAST 12 MONTHS, HAS THE ELECTRIC, GAS, OIL, OR WATER COMPANY THREATENED TO SHUT OFF SERVICE IN YOUR HOME?: NO

## 2024-10-08 SDOH — SOCIAL STABILITY: SOCIAL INSECURITY
WITHIN THE LAST YEAR, HAVE YOU BEEN KICKED, HIT, SLAPPED, OR OTHERWISE PHYSICALLY HURT BY YOUR PARTNER OR EX-PARTNER?: NO

## 2024-10-08 SDOH — SOCIAL STABILITY: SOCIAL INSECURITY
WITHIN THE LAST YEAR, HAVE TO BEEN RAPED OR FORCED TO HAVE ANY KIND OF SEXUAL ACTIVITY BY YOUR PARTNER OR EX-PARTNER?: NO

## 2024-10-08 SDOH — SOCIAL STABILITY: SOCIAL INSECURITY
WITHIN THE LAST YEAR, HAVE YOU BEEN RAPED OR FORCED TO HAVE ANY KIND OF SEXUAL ACTIVITY BY YOUR PARTNER OR EX-PARTNER?: NO

## 2024-10-08 SDOH — SOCIAL STABILITY: SOCIAL INSECURITY: WITHIN THE LAST YEAR, HAVE YOU BEEN AFRAID OF YOUR PARTNER OR EX-PARTNER?: NO

## 2024-10-08 SDOH — SOCIAL STABILITY: SOCIAL INSECURITY: ABUSE: ADULT

## 2024-10-08 SDOH — ECONOMIC STABILITY: INCOME INSECURITY: HOW HARD IS IT FOR YOU TO PAY FOR THE VERY BASICS LIKE FOOD, HOUSING, MEDICAL CARE, AND HEATING?: NOT HARD AT ALL

## 2024-10-08 SDOH — SOCIAL STABILITY: SOCIAL INSECURITY: WITHIN THE LAST YEAR, HAVE YOU BEEN HUMILIATED OR EMOTIONALLY ABUSED IN OTHER WAYS BY YOUR PARTNER OR EX-PARTNER?: NO

## 2024-10-08 SDOH — SOCIAL STABILITY: SOCIAL INSECURITY: WERE YOU ABLE TO COMPLETE ALL THE BEHAVIORAL HEALTH SCREENINGS?: YES

## 2024-10-08 SDOH — SOCIAL STABILITY: SOCIAL INSECURITY: HAVE YOU HAD THOUGHTS OF HARMING ANYONE ELSE?: NO

## 2024-10-08 SDOH — SOCIAL STABILITY: SOCIAL INSECURITY: DO YOU FEEL UNSAFE GOING BACK TO THE PLACE WHERE YOU ARE LIVING?: NO

## 2024-10-08 SDOH — SOCIAL STABILITY: SOCIAL INSECURITY: DOES ANYONE TRY TO KEEP YOU FROM HAVING/CONTACTING OTHER FRIENDS OR DOING THINGS OUTSIDE YOUR HOME?: NO

## 2024-10-08 SDOH — HEALTH STABILITY: MENTAL HEALTH: CURRENT SMOKER: 0

## 2024-10-08 SDOH — ECONOMIC STABILITY: TRANSPORTATION INSECURITY: IN THE PAST 12 MONTHS, HAS LACK OF TRANSPORTATION KEPT YOU FROM MEDICAL APPOINTMENTS OR FROM GETTING MEDICATIONS?: NO

## 2024-10-08 SDOH — SOCIAL STABILITY: SOCIAL INSECURITY: HAS ANYONE EVER THREATENED TO HURT YOUR FAMILY OR YOUR PETS?: NO

## 2024-10-08 ASSESSMENT — PAIN - FUNCTIONAL ASSESSMENT
PAIN_FUNCTIONAL_ASSESSMENT: 0-10

## 2024-10-08 ASSESSMENT — COGNITIVE AND FUNCTIONAL STATUS - GENERAL
WALKING IN HOSPITAL ROOM: A LITTLE
WALKING IN HOSPITAL ROOM: A LITTLE
TOILETING: A LOT
MOVING FROM LYING ON BACK TO SITTING ON SIDE OF FLAT BED WITH BEDRAILS: A LITTLE
DRESSING REGULAR UPPER BODY CLOTHING: A LITTLE
MOBILITY SCORE: 10
DAILY ACTIVITIY SCORE: 21
TURNING FROM BACK TO SIDE WHILE IN FLAT BAD: A LITTLE
TURNING FROM BACK TO SIDE WHILE IN FLAT BAD: A LITTLE
MOVING TO AND FROM BED TO CHAIR: A LITTLE
HELP NEEDED FOR BATHING: A LITTLE
MOVING FROM LYING ON BACK TO SITTING ON SIDE OF FLAT BED WITH BEDRAILS: A LITTLE
CLIMB 3 TO 5 STEPS WITH RAILING: A LITTLE
HELP NEEDED FOR BATHING: A LOT
DRESSING REGULAR UPPER BODY CLOTHING: A LITTLE
PATIENT BASELINE BEDBOUND: NO
STANDING UP FROM CHAIR USING ARMS: TOTAL
MOVING TO AND FROM BED TO CHAIR: A LITTLE
MOVING TO AND FROM BED TO CHAIR: A LITTLE
DAILY ACTIVITIY SCORE: 17
MOBILITY SCORE: 18
HELP NEEDED FOR BATHING: A LITTLE
CLIMB 3 TO 5 STEPS WITH RAILING: A LITTLE
DRESSING REGULAR LOWER BODY CLOTHING: A LOT
MOBILITY SCORE: 18
HELP NEEDED FOR BATHING: A LITTLE
CLIMB 3 TO 5 STEPS WITH RAILING: TOTAL
MOVING FROM LYING ON BACK TO SITTING ON SIDE OF FLAT BED WITH BEDRAILS: A LITTLE
STANDING UP FROM CHAIR USING ARMS: A LITTLE
MOBILITY SCORE: 18
DRESSING REGULAR UPPER BODY CLOTHING: A LITTLE
TURNING FROM BACK TO SIDE WHILE IN FLAT BAD: A LITTLE
CLIMB 3 TO 5 STEPS WITH RAILING: A LITTLE
STANDING UP FROM CHAIR USING ARMS: A LITTLE
DRESSING REGULAR LOWER BODY CLOTHING: A LITTLE
DAILY ACTIVITIY SCORE: 20
TOILETING: A LITTLE
DAILY ACTIVITIY SCORE: 21
DRESSING REGULAR LOWER BODY CLOTHING: A LITTLE
DRESSING REGULAR UPPER BODY CLOTHING: A LITTLE
DRESSING REGULAR LOWER BODY CLOTHING: A LITTLE
MOVING FROM LYING ON BACK TO SITTING ON SIDE OF FLAT BED WITH BEDRAILS: A LITTLE
TURNING FROM BACK TO SIDE WHILE IN FLAT BAD: A LITTLE
WALKING IN HOSPITAL ROOM: TOTAL
MOVING TO AND FROM BED TO CHAIR: TOTAL
WALKING IN HOSPITAL ROOM: A LITTLE
STANDING UP FROM CHAIR USING ARMS: A LITTLE

## 2024-10-08 ASSESSMENT — LIFESTYLE VARIABLES
SKIP TO QUESTIONS 9-10: 1
PRESCIPTION_ABUSE_PAST_12_MONTHS: NO
AUDIT-C TOTAL SCORE: 0
HOW OFTEN DO YOU HAVE A DRINK CONTAINING ALCOHOL: NEVER
HOW OFTEN DO YOU HAVE 6 OR MORE DRINKS ON ONE OCCASION: NEVER
AUDIT-C TOTAL SCORE: 0
SUBSTANCE_ABUSE_PAST_12_MONTHS: NO
HOW MANY STANDARD DRINKS CONTAINING ALCOHOL DO YOU HAVE ON A TYPICAL DAY: PATIENT DOES NOT DRINK

## 2024-10-08 ASSESSMENT — COLUMBIA-SUICIDE SEVERITY RATING SCALE - C-SSRS
1. IN THE PAST MONTH, HAVE YOU WISHED YOU WERE DEAD OR WISHED YOU COULD GO TO SLEEP AND NOT WAKE UP?: NO
6. HAVE YOU EVER DONE ANYTHING, STARTED TO DO ANYTHING, OR PREPARED TO DO ANYTHING TO END YOUR LIFE?: NO
2. HAVE YOU ACTUALLY HAD ANY THOUGHTS OF KILLING YOURSELF?: NO

## 2024-10-08 ASSESSMENT — PAIN SCALES - GENERAL

## 2024-10-08 ASSESSMENT — ACTIVITIES OF DAILY LIVING (ADL)
LACK_OF_TRANSPORTATION: NO
WALKS IN HOME: NEEDS ASSISTANCE
DRESSING YOURSELF: INDEPENDENT
PATIENT'S MEMORY ADEQUATE TO SAFELY COMPLETE DAILY ACTIVITIES?: YES
FEEDING YOURSELF: INDEPENDENT
HEARING - LEFT EAR: FUNCTIONAL
JUDGMENT_ADEQUATE_SAFELY_COMPLETE_DAILY_ACTIVITIES: YES
GROOMING: INDEPENDENT
LACK_OF_TRANSPORTATION: NO
BATHING: NEEDS ASSISTANCE
HEARING - RIGHT EAR: FUNCTIONAL
ADEQUATE_TO_COMPLETE_ADL: YES
LACK_OF_TRANSPORTATION: NO
TOILETING: NEEDS ASSISTANCE

## 2024-10-08 ASSESSMENT — PATIENT HEALTH QUESTIONNAIRE - PHQ9
SUM OF ALL RESPONSES TO PHQ9 QUESTIONS 1 & 2: 0
1. LITTLE INTEREST OR PLEASURE IN DOING THINGS: NOT AT ALL
2. FEELING DOWN, DEPRESSED OR HOPELESS: NOT AT ALL

## 2024-10-08 NOTE — PROGRESS NOTES
Physical Therapy    Physical Therapy Evaluation    Patient Name: Diane Fleischer  MRN: [id number]  [id number]  Today's Date: 10/8/2024   Time Calculation  Start Time: 1448  Stop Time: 1512  Time Calculation (min): 24 min    Assessment/Plan   PT Assessment  PT Assessment Results: Decreased strength, Decreased range of motion, Decreased endurance, Impaired balance, Decreased mobility  Rehab Prognosis: Good  End of Session Communication: Bedside nurse  End of Session Patient Position: Bed, 3 rail up, Alarm off, not on at start of session  IP OR SWING BED PT PLAN  Inpatient or Swing Bed: Inpatient  PT Plan  Treatment/Interventions: Bed mobility, Transfer training, Gait training, Stair training, Balance training, Strengthening, Endurance training, Range of motion, Therapeutic exercise, Therapeutic activity  PT Plan: Ongoing PT  PT Frequency: BID  PT Discharge Recommendations: Low intensity level of continued care (family assist)  PT - OK to Discharge: Yes    Subjective     Current Problem:  Patient Active Problem List   Diagnosis    Arthritis of knee    Arthritis of right knee       General Visit Information:  General  Reason for Referral: PT eval and treat; s/p right TKR on 10/8/24  Referred By: Sera  Past Medical History Relevant to Rehab: thyroid disease, BPPV, RA  Prior to Session Communication: Bedside nurse  Patient Position Received: Bed, 2 rail up, Alarm off, not on at start of session  General Comment: Pt resting in bed upon entering, agreeable to PT. Son and grand daughter present beginning of session. Pt with increased drowsiness end of session, RN aware. Pt also with right knee buckling limiting mobility today.    Home Living:  Home Living  Home Living Comments: Pt lives with adult grandson who can assist upon DC. In a house with ramp entry or 1+3 APRIL with rail. First floor bed and bath. Tub shower with grab bar. Toilet with riser and grab bar. Owns bath bench and wheeled walker. Laundry in basement.    Prior  Level of Function:  Prior Function Per Pt/Caregiver Report  Prior Function Comments: IND ADLs, IND IADLs, IND with ambulation, (-) driving, (-) working    Precautions:  Precautions  Precautions Comment: TKR precautions, fwb RLE, out of bed with assist     Objective     Pain:  Pain Assessment  Pain Assessment: 0-10  0-10 (Numeric) Pain Score: 0 - No pain    Cognition:  Cognition  Overall Cognitive Status: Within Functional Limits (drowsy)    General Assessments:         Sensation  Light Touch: No apparent deficits  Strength  Strength Comments: LLE WFL; RLE ankle 4/5, knee ext 2/5; AROM -10 to 85 degrees           Static Sitting Balance  Static Sitting-Level of Assistance: Close supervision  Dynamic Sitting Balance  Dynamic Sitting-Level of Assistance: Contact guard  Static Standing Balance  Static Standing-Level of Assistance: Maximum assistance (Pt requires max assist to stand with blocking to RLE due to unsteadiness and RLE buckling)  Dynamic Standing Balance  Dynamic Standing-Level of Assistance:  (unable)    Functional Assessments:     Bed Mobility  Bed Mobility:  (supine to sit with min assist; sit to supine with mod assist x 2)  Transfers  Transfer:  (sit to stand with mod assist x 2 and WW, two attempts, first attempt not successful, blocking to RLE)  Ambulation/Gait Training  Ambulation/Gait Training Performed:  (unable due to right knee buckling)      Outcome Measures:  [facility] Basic Mobility  Turning from your back to your side while in a flat bed without using bedrails: A little  Moving from lying on your back to sitting on the side of a flat bed without using bedrails: A little  Moving to and from bed to chair (including a wheelchair): Total  Standing up from a chair using your arms (e.g. wheelchair or bedside chair): Total  To walk in hospital room: Total  Climbing 3-5 steps with railing: Total  Basic Mobility - Total Score: 10      Goals:  Encounter Problems       Encounter Problems (Active)       PT  Problem       PT Goal 1 STG - Pt will amb 100' using WW with SBA  (Progressing)       Start:  10/08/24    Expected End:  10/22/24            PT Goal 2 STG - Pt will transition supine <> sitting with MOD IND  (Progressing)       Start:  10/08/24    Expected End:  10/22/24            PT Goal 3 STG - Pt will transfer STS with MOD IND  (Progressing)       Start:  10/08/24    Expected End:  10/22/24            PT Goal 4 STG - Pt will SPT bed <> chair with SBA (Progressing)       Start:  10/08/24    Expected End:  10/22/24            PT Goal 5 STG - Pt will perform a B LE ther ex program of 2-3 sets of 10  (Progressing)       Start:  10/08/24    Expected End:  10/22/24               Pain - Adult            Education Documentation  Precautions, taught by Pilar Newman PT at 10/8/2024  4:27 PM.  Learner: Patient  Readiness: Acceptance  Method: Explanation  Response: Verbalizes Understanding, Needs Reinforcement    Mobility Training, taught by Pilar Newman PT at 10/8/2024  4:27 PM.  Learner: Patient  Readiness: Acceptance  Method: Explanation  Response: Verbalizes Understanding, Needs Reinforcement    Education Comments  No comments found.

## 2024-10-08 NOTE — ANESTHESIA POSTPROCEDURE EVALUATION
Patient: Diane Fleischer    Procedure Summary       Date: 10/08/24 Room / Location: [facility] OR 07 / Virtual [facility] OR    Anesthesia Start: 0901 Anesthesia Stop: 1125    Procedure: RIGHT TOTAL KNEE REPLACEMENT (Right: Knee) Diagnosis:       Arthritis of right knee      (RIGHT KNEE ARTHRITIS)    Surgeons: Wilder Zamora MD Responsible Provider: Fei Clarke MD    Anesthesia Type: MAC, spinal ASA Status: 3            Anesthesia Type: MAC, spinal    Vitals Value Taken Time   /56 10/08/24 1124   Temp 36.3 10/08/24 1126   Pulse 64 10/08/24 1125   Resp 14 10/08/24 1126   SpO2 99 % 10/08/24 1125   Vitals shown include unfiled device data.    Anesthesia Post Evaluation    Patient location during evaluation: PACU  Patient participation: complete - patient participated  Level of consciousness: awake  Pain management: adequate  Airway patency: patent  Cardiovascular status: acceptable  Respiratory status: acceptable  Hydration status: acceptable  Postoperative Nausea and Vomiting: none      No notable events documented.

## 2024-10-08 NOTE — ANESTHESIA PROCEDURE NOTES
"Peripheral Block    Patient location during procedure: pre-op  Start time: 10/8/2024 8:20 AM  End time: 10/8/2024 8:30 AM  Reason for block: at surgeon's request and post-op pain management  Staffing  Performed: attending   Authorized by: Fei Clarke MD    Performed by: Fei Clarke MD  Preanesthetic Checklist  Completed: patient identified, IV checked, site marked, risks and benefits discussed, surgical consent, monitors and equipment checked, pre-op evaluation and timeout performed   Timeout performed at: 10/8/2024 8:20 AM  Peripheral Block  Patient position: laying flat  Prep: ChloraPrep  Patient monitoring: heart rate, continuous pulse ox and cardiac monitor  Block type: adductor canal  Laterality: right  Injection technique: single-shot  Guidance: ultrasound guided  Needle  Needle gauge: 21 G  Needle length: 8 cm  Needle localization: ultrasound guidance  Needle insertion depth: 5 cm  Test dose: negative  Assessment  Injection assessment: negative aspiration for heme, no paresthesia on injection, incremental injection and local visualized surrounding nerve on ultrasound  Paresthesia pain: none  Heart rate change: no  Slow fractionated injection: yes  Additional Notes  Ropivacaine 0.5% 25 ml\"s with Epinephrine 1:200,000 and Methylprednisolone 40 mg given incrementally in 3 ml's volume with negative aspirations. Patient awake throughout. The patient tolerated the procedure well.           "

## 2024-10-08 NOTE — PROGRESS NOTES
Seen/exam/meds/labs/notes reviewed  S/p r tka earlier today via dr brown  In bed; looks ok; no cp/sob  Has not stood on it yet  Afvss  Rrr cta soft obese nt rle w/dressing/scd  A/p; s/p r tka pod 0; morbid obesity, hypothryoid, osteopenia, et al  Overall stable; comfortable; cont post op med/surg mgmt; ?home tomorrow      Assessment & Plan  Arthritis of knee    Arthritis of right knee

## 2024-10-08 NOTE — CARE PLAN
Problem: Skin  Goal: Decreased wound size/increased tissue granulation at next dressing change  Outcome: Progressing  Flowsheets (Taken 10/8/2024 1941)  Decreased wound size/increased tissue granulation at next dressing change: Promote sleep for wound healing  Goal: Participates in plan/prevention/treatment measures  Outcome: Progressing  Flowsheets (Taken 10/8/2024 1941)  Participates in plan/prevention/treatment measures: Discuss with provider PT/OT consult  Goal: Prevent/manage excess moisture  Outcome: Progressing  Flowsheets (Taken 10/8/2024 1941)  Prevent/manage excess moisture: Cleanse incontinence/protect with barrier cream  Goal: Prevent/minimize sheer/friction injuries  Outcome: Progressing  Flowsheets (Taken 10/8/2024 1941)  Prevent/minimize sheer/friction injuries:   HOB 30 degrees or less   Increase activity/out of bed for meals  Goal: Promote/optimize nutrition  Outcome: Progressing  Flowsheets (Taken 10/8/2024 1941)  Promote/optimize nutrition:   Monitor/record intake including meals   Consume > 50% meals/supplements  Goal: Promote skin healing  Outcome: Progressing  Flowsheets (Taken 10/8/2024 1941)  Promote skin healing: Assess skin/pad under line(s)/device(s)     Problem: Pain  Goal: Takes deep breaths with improved pain control throughout the shift  Outcome: Progressing  Goal: Turns in bed with improved pain control throughout the shift  Outcome: Progressing  Goal: Walks with improved pain control throughout the shift  Outcome: Progressing  Goal: Performs ADL's with improved pain control throughout shift  Outcome: Progressing  Goal: Participates in PT with improved pain control throughout the shift  Outcome: Progressing  Goal: Free from opioid side effects throughout the shift  Outcome: Progressing  Goal: Free from acute confusion related to pain meds throughout the shift  Outcome: Progressing     Problem: Pain - Adult  Goal: Verbalizes/displays adequate comfort level or baseline comfort  level  Outcome: Progressing     Problem: Safety - Adult  Goal: Free from fall injury  Outcome: Progressing     Problem: Discharge Planning  Goal: Discharge to home or other facility with appropriate resources  Outcome: Progressing     Problem: Chronic Conditions and Co-morbidities  Goal: Patient's chronic conditions and co-morbidity symptoms are monitored and maintained or improved  Outcome: Progressing     Problem: Fall/Injury  Goal: Not fall by end of shift  Outcome: Progressing  Goal: Be free from injury by end of the shift  Outcome: Progressing  Goal: Verbalize understanding of personal risk factors for fall in the hospital  Outcome: Progressing  Goal: Verbalize understanding of risk factor reduction measures to prevent injury from fall in the home  Outcome: Progressing  Goal: Use assistive devices by end of the shift  Outcome: Progressing  Goal: Pace activities to prevent fatigue by end of the shift  Outcome: Progressing

## 2024-10-08 NOTE — PROGRESS NOTES
Occupational Therapy    Evaluation    Patient Name: Diane Fleischer  MRN: [id number]  Department: [facility]  Room: [address] [place] [address]  Today's Date: 10/8/2024  Time Calculation  Start Time: 1449  Stop Time: 1512  Time Calculation (min): 23 min        Assessment:  End of Session Communication: Bedside nurse  End of Session Patient Position: Bed, 3 rail up, Alarm off, not on at start of session     Plan:  Treatment Interventions: ADL retraining, Functional transfer training, Compensatory technique education  OT Frequency: Daily  OT Discharge Recommendations: Low intensity level of continued care  OT - OK to Discharge: Yes (to next level of care when cleared by medical team)  Treatment Interventions: ADL retraining, Functional transfer training, Compensatory technique education    Subjective   Current Problem:  1. Arthritis of knee  Admit to inpatient    Full code    Insert and maintain peripheral IV    Saline lock IV    lactated Ringer's infusion    Pulse oximetry, spot    Admit to inpatient    Full code    Insert and maintain peripheral IV    Saline lock IV    Pulse oximetry, spot    povidone-iodine 5 % kit kit    tranexamic acid 1,000 mg in sodium chloride (iso)  mL    morphine 8 mg, ketorolac (Toradol) 30 mg, lidocaine-epinephrine (Xylocaine W/EPI) 1 %-1:100,000 20 mL, ropivacaine (Naropin) 5 mg/mL (0.5 %) 100 mg in sodium chloride 0.9% 49 mL injection    DISCONTINUED: tranexamic acid 1,000 mg in sodium chloride (iso)  mL    DISCONTINUED: ceFAZolin (Ancef) 2 g in dextrose (iso)  mL    CANCELED: NPO Diet Except: Sips with meds; Effective midnight    CANCELED: Height and weight    CANCELED: Vital Signs    CANCELED: Apply NEIL hose knee length    CANCELED: Apply sequential compression device    CANCELED: NPO Diet Except: Sips with meds; Effective midnight    CANCELED: Height and weight    CANCELED: Vital Signs    CANCELED: Apply NEIL hose knee length    CANCELED: Apply sequential compression device      2. Pre-op  testing  Staphylococcus aureus/MRSA colonization, Culture      3. Arthritis of right knee  Surgical Pathology Exam    Surgical Pathology Exam        General:  General  Reason for Referral: impaired adl; pt. admitted for R TKR today  Referred By: Sera  Past Medical History Relevant to Rehab: thyroid disease, bppv, RA  Prior to Session Communication: Bedside nurse  Patient Position Received: Bed, 2 rail up, Alarm off, not on at start of session  General Comment: pt. agreeable to therapy intervention  Precautions:  Precautions Comment: TKR precautions, fwb RLE, out of bed with assist    Vital Sign (Past 2hrs)        Date/Time Vitals Session Patient Position Pulse Resp SpO2 BP MAP (mmHg)    10/08/24 15:42:01 --  --  52  18  98 %  104/73  85                         Pain:       Objective   Cognition:  Overall Cognitive Status: Within Functional Limits           Home Living:  Home Living Comments: pt. lives with her grandson, who works, 1 floor home, 1+3 step entry with rail or pt. can use ramp for entrance, laundry in basement, tub/shower with gb's, bench, wh. walker, st. toilet, riser with gb  Prior Function:  Prior Function Comments: pt. independent with adl, no use of device for ambulation, pt. does not drive or work  IADL History:     ADL:  ADL Comments: dependent to thread underwear, stand with mod assist x 2 to attempt to pull up, however RLE buckled and pt. returned to sitting, max assist x 1 to pull up over hips in sidelying  Activity Tolerance:     Bed Mobility/Transfers: Bed Mobility  Bed Mobility:  (min assist x 1 supine to sit, mod assist x 2 sit to supine)    Transfers  Transfer:  (sit<> stand from eob, mod assist x 2)      Functional Mobility:  Functional Mobility  Functional Mobility Performed:  (pt. unable to attempt due to RLE buckling)     Strength:  Strength Comments: calvin's strength wfl per observation    Outcome Measures:[facility] Daily Activity  Putting on and taking off regular lower body clothing: A  lot  Bathing (including washing, rinsing, drying): A lot  Putting on and taking off regular upper body clothing: A little  Toileting, which includes using toilet, bedpan or urinal: A lot  Taking care of personal grooming such as brushing teeth: None  Eating Meals: None  Daily Activity - Total Score: 17        Education Documentation  Precautions, taught by Mariana Rogel OT at 10/8/2024  4:36 PM.  Learner: Patient  Readiness: Acceptance  Method: Explanation  Response: Verbalizes Understanding, Needs Reinforcement    ADL Training, taught by Mariana Rogel OT at 10/8/2024  4:36 PM.  Learner: Patient  Readiness: Acceptance  Method: Explanation  Response: Verbalizes Understanding, Needs Reinforcement         Goals:  Encounter Problems       Encounter Problems (Active)       OT Goals       Increase functional mobility and  functional transfers to supervision for bed/chair/toilet/shower/car with dme prn   (Progressing)       Start:  10/08/24    Expected End:  10/15/24            increase standing tolerance x 5-7 minutes with supervision to promote greater activity tolerance for completion of adl/transfers  (Progressing)       Start:  10/08/24    Expected End:  10/15/24            Increase lb dressing/bathing to supervision with dme prn  (Progressing)       Start:  10/08/24    Expected End:  10/15/24            Increase toileting to supervision with dme prn  (Progressing)       Start:  10/08/24    Expected End:  10/15/24            Pt. To adhere to walker safety techniques/TKR precautions with supervision for assist/safety with transfers/adl/mobility with dme prn  (Progressing)       Start:  10/08/24    Expected End:  10/15/24

## 2024-10-08 NOTE — ANESTHESIA PREPROCEDURE EVALUATION
Patient: Diane Fleischer    Procedure Information       Date/Time: 10/08/24 0900    Procedure: RIGHT TOTAL KNEE REPLACEMENT (Right: Knee) - RIGHT TOTAL KNEE REPLACEMENT EXTENDED RECOVERY    Location: [facility] OR 07 / Virtual [facility] OR    Surgeons: Wilder Zamora MD            Relevant Problems   Anesthesia (within normal limits)       Clinical information reviewed:    Allergies  Meds   Med Hx  Surg Hx   Fam Hx          NPO Detail:  NPO/Void Status  Date of Last Liquid: 10/07/24  Time of Last Liquid: 2200  Date of Last Solid: 10/07/24  Time of Last Solid: 1800         Physical Exam    Airway  Mallampati: II  TM distance: >3 FB  Neck ROM: full     Cardiovascular - normal exam  Rhythm: regular  Rate: normal     Dental    Pulmonary - normal exam     Abdominal      Other findings: Multiple chipped teeth        Anesthesia Plan    History of general anesthesia?: yes  History of complications of general anesthesia?: no    ASA 3     regional, spinal, general and MAC     The patient is not a current smoker.    intravenous induction   Postoperative administration of opioids is intended.  Trial extubation is planned.  Anesthetic plan and risks discussed with patient.  Use of blood products discussed with patient who consented to blood products.    Plan discussed with CAA.

## 2024-10-08 NOTE — CARE PLAN
The patient's goals for the shift include      The clinical goals for the shift include        Problem: Skin  Goal: Decreased wound size/increased tissue granulation at next dressing change  Outcome: Progressing  Flowsheets (Taken 10/8/2024 1259)  Decreased wound size/increased tissue granulation at next dressing change: Promote sleep for wound healing  Goal: Participates in plan/prevention/treatment measures  Outcome: Progressing  Flowsheets (Taken 10/8/2024 1259)  Participates in plan/prevention/treatment measures: Discuss with provider PT/OT consult  Goal: Prevent/manage excess moisture  Outcome: Progressing  Flowsheets (Taken 10/8/2024 1259)  Prevent/manage excess moisture: Cleanse incontinence/protect with barrier cream  Goal: Prevent/minimize sheer/friction injuries  Outcome: Progressing  Flowsheets (Taken 10/8/2024 1259)  Prevent/minimize sheer/friction injuries:   Complete micro-shifts as needed if patient unable. Adjust patient position to relieve pressure points, not a full turn   HOB 30 degrees or less  Goal: Promote/optimize nutrition  Outcome: Progressing  Flowsheets (Taken 10/8/2024 1259)  Promote/optimize nutrition:   Monitor/record intake including meals   Offer water/supplements/favorite foods  Goal: Promote skin healing  Outcome: Progressing  Flowsheets (Taken 10/8/2024 1259)  Promote skin healing:   Protective dressings over bony prominences   Rotate device position/do not position patient on device     Problem: Pain  Goal: Takes deep breaths with improved pain control throughout the shift  Outcome: Progressing  Goal: Turns in bed with improved pain control throughout the shift  Outcome: Progressing  Goal: Walks with improved pain control throughout the shift  Outcome: Progressing  Goal: Performs ADL's with improved pain control throughout shift  Outcome: Progressing  Goal: Participates in PT with improved pain control throughout the shift  Outcome: Progressing  Goal: Free from opioid side effects  throughout the shift  Outcome: Progressing  Goal: Free from acute confusion related to pain meds throughout the shift  Outcome: Progressing     Problem: Pain - Adult  Goal: Verbalizes/displays adequate comfort level or baseline comfort level  Outcome: Progressing  Flowsheets (Taken 10/8/2024 1259)  Verbalizes/displays adequate comfort level or baseline comfort level:   Implement non-pharmacological measures as appropriate and evaluate response   Consider cultural and social influences on pain and pain management     Problem: Safety - Adult  Goal: Free from fall injury  Outcome: Progressing  Flowsheets (Taken 10/8/2024 1259)  Free from fall injury: Instruct family/caregiver on patient safety     Problem: Discharge Planning  Goal: Discharge to home or other facility with appropriate resources  Outcome: Progressing  Flowsheets (Taken 10/8/2024 1259)  Discharge to home or other facility with appropriate resources:   Arrange for needed discharge resources and transportation as appropriate   Identify discharge learning needs (meds, wound care, etc)     Problem: Chronic Conditions and Co-morbidities  Goal: Patient's chronic conditions and co-morbidity symptoms are monitored and maintained or improved  Outcome: Progressing  Flowsheets (Taken 10/8/2024 1259)  Care Plan - Patient's Chronic Conditions and Co-Morbidity Symptoms are Monitored and Maintained or Improved: Monitor and assess patient's chronic conditions and comorbid symptoms for stability, deterioration, or improvement     Problem: Fall/Injury  Goal: Not fall by end of shift  Outcome: Progressing  Goal: Be free from injury by end of the shift  Outcome: Progressing  Goal: Verbalize understanding of personal risk factors for fall in the hospital  Outcome: Progressing  Goal: Verbalize understanding of risk factor reduction measures to prevent injury from fall in the home  Outcome: Progressing  Goal: Use assistive devices by end of the shift  Outcome:  Progressing  Goal: Pace activities to prevent fatigue by end of the shift  Outcome: Progressing

## 2024-10-08 NOTE — OP NOTE
PRE OP DIAGNOSIS - OA RT KNEE    POST OP DIAGNOSIS- OA RT KNEE    PROCEDURE - RT TKR    SURGEON- GRIS GRISSOM MD    ASSIST-   LIBRADO SPRAGUE PA-C    ANESTHESIA- SPINAL    BLOOD LOSS 200 CC    FINDINGS- SEVERE TRICOMPARTMENT OA    TOURNIQUET- NONE    DRAIN- NONE    IMPLANTS J&J PFC    FEMUR   3 PS   TIBIAL BASE PLATE  3   INSERT    10 MM    PATELLA  35    OPERATIVE PROCEDURE    PATIENT WAS TAKEN TO THE OPERATING ROOM. UNDER ADEQUATE SPINAL ANESTHESIA, THE PATIENT WAS PLACED SUPINE ON THE TABLE AND THE RIGHT LEG WAS PREPPED AND DRAPED IN A STERILE MANNER. NO TOURNIQUET WAS USED. THE KNEE WAS FLEXED TO 90 DEGREES AND ANTERIOR MIDLINE INCISION WAS MADE THROUGH THE SKIN, SUBCUTANEOUS TISSUE AND A MEDIAL DISSECTION WAS MADE.  A MEDIAL PARAPATELLAR CAPSULOTOMY WAS MADE AND THE PATELLA WAS EVERTED LATERALLY.  THE INFRAPATELLAR FAT PAD WAS EXCISED AND A MEDIAL RELEASE WAS PERFORMED.  THE ACL, PCL AND MEDIAL AND LATERAL MENISCI WERE EXCISED.  A DRILL WAS USED TO MAKE A HOLE IN THE DISTAL FEMUR WITH THE JIG SET AT 5 DEGREES VALGUS.  THE DISTAL FEMORAL CUT WAS MADE AND SIZED TO THE ABOVE IMPLANT. THE ANTERIOR, POSTERIOR CHAMFER AND BOX CUTS WERE MADE.  I TRIALED THE ABOVE SIZED FEMUR. NEXT A DRILL WAS USE TO ENTER THE PROXIMAL TIBIA AND THE TIBIA WAS CUT USING A 3 DEGREE POSTERIOR SLOPE BLOCK AND SIZED TO THE ABOVE IMPLANT.  A TRIAL REDUCTION WAS MADE WITH THE ABOVE FEMUR, TIBIA AND INSERT. THE PATELLA WAS CUT FREE HAND AND SIZED TO THE ABOVE IMPLANT.  THE KNEE HAD GOOD RANGE OF MOTION, GOOD MEDIAL AND LATERAL STABILITY AND GOOD PATELLAR TRACKING.  THE TRIAL COMPONENTS WERE REMOVED AND THE KNEE WAS IRRIGATED WITH PULSE IRRIGATION AND AN ORTHOPAEDIC COCKTAIL WAS INJECTED INTO THE POSTERIOR CAPSULE.  USING THE APPROPRIATE CEMENT AND THE J&J PFC SYSTEM THE ABOVE IMPLANTS WERE CEMENTED AND THE KNEE WAS PLACED IN FULL EXTENSION UNTIL THE CEMENT HARDENED.  EXCESS CEMENT WAS REMOVED.  AGAIN THE KNEE HAD GOOD RANGE OF MOTION, GOOD  STABILITY AND GOOD PATELLA TRACKING. THE KNEE WAS THEN IRRIGATED.  THE DEEP TISSUES WERE CLOSED WITH  #1 VICRYL, THE SUBCUTANEOUS TISSUE WAS CLOSED WITH 2-0 VICRYL AND THE SKIN WAS CLOSED WITH STAPLES.  A DRY STERILE BANDAGE WAS APPLIED AND THE PATIENT RETURNED TO THE RECOVERY ROOM IN SATISFACTORY CONDITION.    THE EXCELLENT ASSISTANCE OF CERTIFIED PA WAS NECESSARY FOR SUCCESSFUL OUTCOME OF THIS SURGERY INCLUDING PREP, EXPOSURE, RETRACTING AND CLOSURE    DICTATED BY DR. GIRS GRISSOM

## 2024-10-08 NOTE — ANESTHESIA PROCEDURE NOTES
Spinal Block    Patient location during procedure: OR  Start time: 10/8/2024 9:03 AM  End time: 10/8/2024 9:10 AM  Reason for block: primary anesthetic  Staffing  Performed: attending   Authorized by: Artis Sullivan MD    Performed by: Fei Clarke MD    Preanesthetic Checklist  Completed: patient identified, IV checked, site marked, risks and benefits discussed, surgical consent, monitors and equipment checked, pre-op evaluation, timeout performed and sterile techniques followed  Block Timeout  RN/Licensed healthcare professional reads aloud to the Anesthesia provider and entire team: Patient identity, procedure with side and site, patient position, and as applicable the availability of implants/special equipment/special requirements.  Patient on coagulant treatment: no  Timeout performed at: 10/8/2024 9:03 AM  Spinal Block  Patient position: sitting  Prep: ChloraPrep  Sterility prep: cap, gloves, drape, hand hygiene and mask  Sedation level: light sedation  Patient monitoring: blood pressure, continuous pulse oximetry and heart rate  Approach: midline  Vertebral space: L3-4  Injection technique: single-shot  Needle  Needle type: Cesar   Needle gauge: 22 G  Needle length: 3.5 in  Free flowing CSF: yes    Assessment  Sensory level: T10 bilateral  Block outcome: patient comfortable  Procedure assessment: patient sedated but conversant throughout procedure and patient tolerated procedure well with no immediate complications  Additional Notes  Sterile drape, Lidocaine 1 %skin wheal, 20 gauge introducer, 25 gauge Cesar, positive CSF, no heme, Bupivacaine 12 mg Hyperbaric 0.75% needles removed.

## 2024-10-09 ENCOUNTER — HOME HEALTH ADMISSION (OUTPATIENT)
Dept: HOME HEALTH SERVICES | Facility: HOME HEALTH | Age: 69
End: 2024-10-09
Payer: COMMERCIAL

## 2024-10-09 ENCOUNTER — DOCUMENTATION (OUTPATIENT)
Dept: HOME HEALTH SERVICES | Facility: HOME HEALTH | Age: 69
End: 2024-10-09
Payer: COMMERCIAL

## 2024-10-09 ENCOUNTER — PHARMACY VISIT (OUTPATIENT)
Dept: PHARMACY | Facility: CLINIC | Age: 69
End: 2024-10-09
Payer: COMMERCIAL

## 2024-10-09 VITALS
HEIGHT: 64 IN | OXYGEN SATURATION: 99 % | TEMPERATURE: 98.2 F | HEART RATE: 74 BPM | RESPIRATION RATE: 18 BRPM | SYSTOLIC BLOOD PRESSURE: 134 MMHG | WEIGHT: 242.51 LBS | BODY MASS INDEX: 41.4 KG/M2 | DIASTOLIC BLOOD PRESSURE: 88 MMHG

## 2024-10-09 LAB
ERYTHROCYTE [DISTWIDTH] IN BLOOD BY AUTOMATED COUNT: 12.8 % (ref 11.5–14.5)
HCT VFR BLD AUTO: 32.9 % (ref 36–46)
HGB BLD-MCNC: 10.6 G/DL (ref 12–16)
HOLD SPECIMEN: NORMAL
MCH RBC QN AUTO: 28.3 PG (ref 26–34)
MCHC RBC AUTO-ENTMCNC: 32.2 G/DL (ref 32–36)
MCV RBC AUTO: 88 FL (ref 80–100)
NRBC BLD-RTO: 0 /100 WBCS (ref 0–0)
PLATELET # BLD AUTO: 239 X10*3/UL (ref 150–450)
RBC # BLD AUTO: 3.74 X10*6/UL (ref 4–5.2)
WBC # BLD AUTO: 11.9 X10*3/UL (ref 4.4–11.3)

## 2024-10-09 PROCEDURE — 7100000011 HC EXTENDED STAY RECOVERY HOURLY - NURSING UNIT

## 2024-10-09 PROCEDURE — 97116 GAIT TRAINING THERAPY: CPT | Mod: CQ,GP

## 2024-10-09 PROCEDURE — 99238 HOSP IP/OBS DSCHRG MGMT 30/<: CPT | Performed by: CLINICAL NURSE SPECIALIST

## 2024-10-09 PROCEDURE — 97110 THERAPEUTIC EXERCISES: CPT | Mod: CQ,GP

## 2024-10-09 PROCEDURE — 85027 COMPLETE CBC AUTOMATED: CPT | Performed by: ORTHOPAEDIC SURGERY

## 2024-10-09 PROCEDURE — 97535 SELF CARE MNGMENT TRAINING: CPT | Mod: CQ,GP

## 2024-10-09 PROCEDURE — RXMED WILLOW AMBULATORY MEDICATION CHARGE

## 2024-10-09 PROCEDURE — 2500000004 HC RX 250 GENERAL PHARMACY W/ HCPCS (ALT 636 FOR OP/ED): Performed by: ORTHOPAEDIC SURGERY

## 2024-10-09 PROCEDURE — 97535 SELF CARE MNGMENT TRAINING: CPT | Mod: CO,GO

## 2024-10-09 PROCEDURE — 36415 COLL VENOUS BLD VENIPUNCTURE: CPT | Performed by: ORTHOPAEDIC SURGERY

## 2024-10-09 PROCEDURE — 2500000001 HC RX 250 WO HCPCS SELF ADMINISTERED DRUGS (ALT 637 FOR MEDICARE OP): Performed by: ORTHOPAEDIC SURGERY

## 2024-10-09 RX ORDER — POLYETHYLENE GLYCOL 3350 17 G/17G
17 POWDER, FOR SOLUTION ORAL DAILY PRN
Start: 2024-10-09

## 2024-10-09 RX ORDER — ACETAMINOPHEN 325 MG/1
650 TABLET ORAL EVERY 6 HOURS SCHEDULED
Start: 2024-10-09

## 2024-10-09 RX ORDER — OXYCODONE HYDROCHLORIDE 5 MG/1
5-10 TABLET ORAL EVERY 6 HOURS PRN
Qty: 40 TABLET | Refills: 0 | Status: SHIPPED | OUTPATIENT
Start: 2024-10-09 | End: 2024-10-16

## 2024-10-09 ASSESSMENT — COGNITIVE AND FUNCTIONAL STATUS - GENERAL
HELP NEEDED FOR BATHING: A LITTLE
DRESSING REGULAR UPPER BODY CLOTHING: A LITTLE
DRESSING REGULAR LOWER BODY CLOTHING: A LOT
TURNING FROM BACK TO SIDE WHILE IN FLAT BAD: A LITTLE
DRESSING REGULAR LOWER BODY CLOTHING: A LITTLE
WALKING IN HOSPITAL ROOM: A LITTLE
MOVING TO AND FROM BED TO CHAIR: A LOT
HELP NEEDED FOR BATHING: A LITTLE
TURNING FROM BACK TO SIDE WHILE IN FLAT BAD: A LITTLE
CLIMB 3 TO 5 STEPS WITH RAILING: A LOT
WALKING IN HOSPITAL ROOM: A LOT
DAILY ACTIVITIY SCORE: 18
TOILETING: A LITTLE
MOVING TO AND FROM BED TO CHAIR: A LITTLE
EATING MEALS: A LITTLE
PERSONAL GROOMING: A LITTLE
MOBILITY SCORE: 18
TURNING FROM BACK TO SIDE WHILE IN FLAT BAD: A LITTLE
DAILY ACTIVITIY SCORE: 17
DRESSING REGULAR UPPER BODY CLOTHING: A LITTLE
CLIMB 3 TO 5 STEPS WITH RAILING: A LITTLE
MOVING FROM LYING ON BACK TO SITTING ON SIDE OF FLAT BED WITH BEDRAILS: A LITTLE
TOILETING: A LOT
STANDING UP FROM CHAIR USING ARMS: A LOT
MOVING FROM LYING ON BACK TO SITTING ON SIDE OF FLAT BED WITH BEDRAILS: A LITTLE
PERSONAL GROOMING: A LITTLE
STANDING UP FROM CHAIR USING ARMS: A LITTLE
CLIMB 3 TO 5 STEPS WITH RAILING: A LOT
MOBILITY SCORE: 14
MOVING TO AND FROM BED TO CHAIR: A LITTLE
MOVING FROM LYING ON BACK TO SITTING ON SIDE OF FLAT BED WITH BEDRAILS: A LITTLE
STANDING UP FROM CHAIR USING ARMS: A LITTLE
MOBILITY SCORE: 17
WALKING IN HOSPITAL ROOM: A LITTLE

## 2024-10-09 ASSESSMENT — PAIN - FUNCTIONAL ASSESSMENT
PAIN_FUNCTIONAL_ASSESSMENT: 0-10

## 2024-10-09 ASSESSMENT — PAIN SCALES - GENERAL
PAINLEVEL_OUTOF10: 7
PAINLEVEL_OUTOF10: 2
PAINLEVEL_OUTOF10: 0 - NO PAIN
PAINLEVEL_OUTOF10: 0 - NO PAIN
PAINLEVEL_OUTOF10: 7

## 2024-10-09 ASSESSMENT — ACTIVITIES OF DAILY LIVING (ADL): HOME_MANAGEMENT_TIME_ENTRY: 55

## 2024-10-09 NOTE — DISCHARGE SUMMARY
Discharge Diagnosis  Arthritis of right knee  Obesity with BMI 41.63    Issues Requiring Follow-Up  None     Test Results Pending At Discharge  Pending Labs       Order Current Status    Surgical Pathology Exam In process            Hospital Course   Patient is a 68 year old female with severe osteoarthritis of right knee.  On 10/8/24, patient underwent uncomplicated right total knee replacement by Dr Wilder Zamora.  Spinal anesthesia was utilized and estimated blood loss intraoperatively was 200cc.  Patient received one dose of IV antibiotics preoperatively and 2 additional doses were administered post operatively.  Her medical physician was placed on consult to help assist with post op medical management.  For DVT prophylaxis, patient was ordered eliquis 2.5mg twice a day and bilateral SCDs.  Physical and occupational therapy were initiated with full weight bearing status.    Patient progressed well with therapy.  She initially experienced some knee buckling s/p peripheral block infusion, which resolved.  Her hemoglobin level was stable at 10.6 and she remained afebrile.  On POD #1, patient was medically stable and safe for discharge home with [facility] follow up.  At time of discharge, patient was ambulating 100 feet with use of wheeled walker and contact guard assistance.  Her right knee range of motion was 0 to 90 degrees.  Home going script of eliquis 2.5mg twice daily was provided to patient along with bilateral eduardo hose compression stockings to be worn at home for 4 weeks.  For pain management, oxycodone script was given.  Anticipate need for extended use of narcotic medications as well as higher MED requirements based on usual post operative incisional pain following total joint replacement surgery.  Patient will follow up in office with Dr Zamora in 2 weeks for staple removal.      Pertinent Physical Exam At Time of Discharge  Physical Exam    Home Medications     Medication List      START taking these  medications     acetaminophen 325 mg tablet; Commonly known as: Tylenol; Take 2 tablets   (650 mg) by mouth every 6 hours.   Eliquis 2.5 mg tablet; Generic drug: apixaban; Take 1 tablet (2.5 mg) by   mouth every 12 hours.   oxyCODONE 5 mg immediate release tablet; Commonly known as: Roxicodone;   Take 1-2 tablets (5-10 mg) by mouth every 6 hours if needed for severe   pain (7 - 10) for up to 7 days.   polyethylene glycol 17 gram packet; Commonly known as: Glycolax,   Miralax; Take 17 g by mouth once daily as needed (take dose if you do NOT   have bowel movement by Saturday Oct 12th).     CONTINUE taking these medications     calcitriol 0.25 mcg capsule; Commonly known as: Rocaltrol   calcium citrate-vitamin D2 250 mg-2.5 mcg (100 unit) tablet   meclizine 25 mg tablet; Commonly known as: Antivert     STOP taking these medications     chlorhexidine 0.12 % solution; Commonly known as: Peridex       Outpatient Follow-Up  Future Appointments   Date Time Provider Department Center   10/10/2024 To Be Determined Stu Membreno, PT [facility]       Cheryl Ingram, APRN-CNS

## 2024-10-09 NOTE — HH CARE COORDINATION
Home Care received a Referral for Physical Therapy. We have processed the referral for a Start of Care on 24 HOURS .     If you have any questions or concerns, please feel free to contact us at [phone]. Follow the prompts, enter your five digit zip code, and you will be directed to your care team on [facility].

## 2024-10-09 NOTE — PROGRESS NOTES
Physical Therapy    Physical Therapy Treatment    Patient Name: Diane Fleischer  MRN: [id number]  Department: [facility]  Room: [address] [place] [address]  Today's Date: 10/9/2024  Time Calculation  Start Time: 0901  Stop Time: 1001  Time Calculation (min): 60 min         Assessment/Plan         PT Plan  Treatment/Interventions: Bed mobility, Transfer training, Gait training, Stair training, Balance training, Strengthening, Endurance training, Range of motion, Therapeutic exercise, Therapeutic activity  PT Plan: Ongoing PT  PT Frequency: BID  PT Discharge Recommendations: Low intensity level of continued care (family assist)  PT - OK to Discharge: Yes      General Visit Information:   PT  Visit  PT Received On: 10/09/24                        Objective   Pain:r knee 4/10                    Treatments:  Therapeutic Exercise  Therapeutic Exercise Performed:  (supine rle ap's, qs,hs,saqs,slrs,laqs, sitting heelslides x 20 reps ea    r knee arom 0-90 degrees  cold pack to r knee post rx)    Bed Mobility  Bed Mobility:  (supine to sit and sit to supine sba)    Ambulation/Gait Training  Ambulation/Gait Training Performed:  (ambulated 100 feet x 2 wearing r knee immobilizer cga and wheelchair follow)  Transfers  Transfer:  (sit to stand sba)         Outcome Measures:  [facility] Basic Mobility  Turning from your back to your side while in a flat bed without using bedrails: A little  Moving from lying on your back to sitting on the side of a flat bed without using bedrails: A little  Moving to and from bed to chair (including a wheelchair): A little  Standing up from a chair using your arms (e.g. wheelchair or bedside chair): A little  To walk in hospital room: A little  Climbing 3-5 steps with railing: A lot  Basic Mobility - Total Score: 17    Education Documentation  Precautions, taught by Bindu Mason PTA at 10/9/2024  2:28 PM.  Learner: Patient  Readiness: Acceptance  Method: Demonstration  Response: Demonstrated Understanding    ADL Training,  taught by Bindu Mason PTA at 10/9/2024  2:28 PM.  Learner: Patient  Readiness: Acceptance  Method: Demonstration  Response: Demonstrated Understanding    Precautions, taught by Bindu Mason PTA at 10/9/2024  2:28 PM.  Learner: Patient  Readiness: Acceptance  Method: Demonstration  Response: Demonstrated Understanding    Mobility Training, taught by Bindu Mason PTA at 10/9/2024  2:28 PM.  Learner: Patient  Readiness: Acceptance  Method: Demonstration  Response: Demonstrated Understanding    Education Comments  No comments found.               Encounter Problems       Encounter Problems (Active)       PT Problem       PT Goal 1 STG - Pt will amb 100' using WW with SBA  (Progressing)       Start:  10/08/24    Expected End:  10/22/24            PT Goal 2 STG - Pt will transition supine <> sitting with MOD IND  (Progressing)       Start:  10/08/24    Expected End:  10/22/24            PT Goal 3 STG - Pt will transfer STS with MOD IND  (Progressing)       Start:  10/08/24    Expected End:  10/22/24            PT Goal 4 STG - Pt will SPT bed <> chair with SBA (Progressing)       Start:  10/08/24    Expected End:  10/22/24            PT Goal 5 STG - Pt will perform a B LE ther ex program of 2-3 sets of 10  (Progressing)       Start:  10/08/24    Expected End:  10/22/24               Pain - Adult

## 2024-10-09 NOTE — PROGRESS NOTES
Occupational Therapy    OT Treatment    Patient Name: Diane Fleischer  MRN: [id number]  Department: [facility]  Room: [address] [place] [address]  Today's Date: 10/9/2024  Time Calculation  Start Time: 0817  Stop Time: 0912  Time Calculation (min): 55 min        Assessment:  End of Session Communication: Bedside nurse, PCT/NA/CTA  End of Session Patient Position: Up in chair, Alarm off, not on at start of session     Plan:  Treatment Interventions: ADL retraining, Functional transfer training, Compensatory technique education  OT Frequency: Daily  OT Discharge Recommendations: Low intensity level of continued care  OT - OK to Discharge: Yes (to next level of care when cleared by medical team)  Treatment Interventions: ADL retraining, Functional transfer training, Compensatory technique education    Subjective   Previous Visit Info:  OT Last Visit  OT Received On: 10/09/24  General:  General  Prior to Session Communication: Bedside nurse  Patient Position Received: Up in chair, Alarm off, not on at start of session  General Comment: pleasant and cooperative, attempted first STS and pt buckling in R knee, knee immobilizer placed on pt for increased safety for rest of session  Precautions:  LE Weight Bearing Status: Weight Bearing as Tolerated  Medical Precautions: Fall precautions  Post-Surgical Precautions: Right total knee precautions            Pain:  Pain Assessment  Pain Assessment:  (min c/o pain in R knee)    Objective         Activities of Daily Living: LE Dressing  LE Dressing: Yes  Pants Level of Assistance: Minimum assistance (with use of reacher)  Sock Level of Assistance: Maximum assistance  Compression Hose Level of Assistance: Maximum assistance (donned tubi  d/t amount of swelling in LE's). Pt educated on use of compression stockings, importance of compression stockings, donning/doffing, and wear schedule.  LE Dressing Where Assessed: Chair  LE Dressing Comments: Max A to doff/don knee immobilizer  Pt educated on TKR  precautions as applied to self care tasks and transfers. Pt verbalized and demonstrated understanding throughout tx session.     Functional Standing Tolerance:  Time: 5:00 standing at FWW  Bed Mobility/Transfers: Transfers  Transfer: Yes  Transfer 1  Technique 1: Sit to stand, Stand to sit  Transfer Device 1: Walker  Transfer Level of Assistance 1:  (Mod A for STS without knee immobilizer, CGA-Min A with knee immobilizer from chair with arms)    Car Transfers  Car Transfers Comments: .Pt educated on transferring in/out of car adhering to precautions. Following demonstration from this therapist pt verbalized understanding.         Functional Mobility:  Functional Mobility  Functional Mobility Performed: Yes  Functional Mobility 1  Device 1: Rolling walker  Assistance 1: Minimum assistance  Comments 1: pt ambulated in room and out into hallway with min vc for increased safety      Outcome Measures:[facility] Daily Activity  Putting on and taking off regular lower body clothing: A lot  Bathing (including washing, rinsing, drying): A little  Putting on and taking off regular upper body clothing: A little  Toileting, which includes using toilet, bedpan or urinal: A lot  Taking care of personal grooming such as brushing teeth: A little  Eating Meals: None  Daily Activity - Total Score: 17        Education Documentation  Precautions, taught by JERICHO Sorensen at 10/9/2024 12:54 PM.  Learner: Patient  Readiness: Acceptance  Method: Explanation  Response: Verbalizes Understanding    ADL Training, taught by JERICHO Sorensen at 10/9/2024 12:54 PM.  Learner: Patient  Readiness: Acceptance  Method: Explanation  Response: Verbalizes Understanding    Education Comments  No comments found.               Goals:  Encounter Problems       Encounter Problems (Active)       OT Goals       Increase functional mobility and  functional transfers to supervision for bed/chair/toilet/shower/car with dme prn   (Progressing)        Start:  10/08/24    Expected End:  10/15/24            increase standing tolerance x 5-7 minutes with supervision to promote greater activity tolerance for completion of adl/transfers  (Progressing)       Start:  10/08/24    Expected End:  10/15/24            Increase lb dressing/bathing to supervision with dme prn  (Progressing)       Start:  10/08/24    Expected End:  10/15/24            Increase toileting to supervision with dme prn  (Progressing)       Start:  10/08/24    Expected End:  10/15/24            Pt. To adhere to walker safety techniques/TKR precautions with supervision for assist/safety with transfers/adl/mobility with dme prn  (Progressing)       Start:  10/08/24    Expected End:  10/15/24

## 2024-10-09 NOTE — PROGRESS NOTES
Patient attended pre op educational TJR class.  RAPT score 9.    Discussed discharge plan with patient.  For discharge home today with [facility] follow up.  Verified with patient she plans to utilize [facility] as indicated by surgeon's office.  Referral placed for SOC tomorrow.      Reviewed discharge instructions with patient.  Patient verbalizes understanding of activity , wound care, pain management, home going medications and follow up care.  TJR Zone form, pain medication administration form and mepilex instruction sheet provided to patient.

## 2024-10-09 NOTE — PROGRESS NOTES
Physical Therapy    Physical Therapy Treatment    Patient Name: Diane Fleischer  MRN: [id number]  Department: [facility]  Room: [address] [place] [address]  Today's Date: 10/9/2024  Time Calculation  Start Time: 1301  Stop Time: 1401  Time Calculation (min): 60 min         Assessment/Plan         PT Plan  Treatment/Interventions: Bed mobility, Transfer training, Gait training, Stair training, Balance training, Strengthening, Endurance training, Range of motion, Therapeutic exercise, Therapeutic activity  PT Plan: Ongoing PT  PT Frequency: BID  PT Discharge Recommendations: Low intensity level of continued care (family assist)  PT - OK to Discharge: Yes      General Visit Information:   PT  Visit  PT Received On: 10/09/24       Subjective                  Objective   Pain: r knee pain 7/10 initially. Was given oxycodone and pain decreased to 1/10 at end of treatment session                       Treatments:  Therapeutic Exercise  Therapeutic Exercise Performed:  (supine rle ap's, qs,hs,saqs,slrs,laqs, sitting heelslides x 20 reps   r knee arom 0-90 degrees  cold pack to r knee post rx)    Bed Mobility  Bed Mobility:  (supine to sit and sit to supine sba)    Ambulation/Gait Training  Ambulation/Gait Training Performed:  (ambulated 100 feet x 2 using fixed wheeled walker cga   no r knee buckling)  Transfers  Transfer:  (sit to stand sba)         Outcome Measures:  [facility] Basic Mobility  Turning from your back to your side while in a flat bed without using bedrails: A little  Moving from lying on your back to sitting on the side of a flat bed without using bedrails: A little  Moving to and from bed to chair (including a wheelchair): A little  Standing up from a chair using your arms (e.g. wheelchair or bedside chair): A little  To walk in hospital room: A little  Climbing 3-5 steps with railing: A little  Basic Mobility - Total Score: 18    Education Documentation  Precautions, taught by Bindu Mason PTA at 10/9/2024  2:58 PM.  Learner:  Patient  Readiness: Acceptance  Method: Demonstration  Response: Demonstrated Understanding    ADL Training, taught by Bindu Mason PTA at 10/9/2024  2:58 PM.  Learner: Patient  Readiness: Acceptance  Method: Demonstration  Response: Demonstrated Understanding    Precautions, taught by Bindu Mason PTA at 10/9/2024  2:58 PM.  Learner: Patient  Readiness: Acceptance  Method: Demonstration  Response: Demonstrated Understanding    Mobility Training, taught by Bindu Mason PTA at 10/9/2024  2:58 PM.  Learner: Patient  Readiness: Acceptance  Method: Demonstration  Response: Demonstrated Understanding    Precautions, taught by Bindu Mason PTA at 10/9/2024  2:28 PM.  Learner: Patient  Readiness: Acceptance  Method: Demonstration  Response: Demonstrated Understanding    ADL Training, taught by Bindu Mason PTA at 10/9/2024  2:28 PM.  Learner: Patient  Readiness: Acceptance  Method: Demonstration  Response: Demonstrated Understanding    Precautions, taught by Bindu Mason PTA at 10/9/2024  2:28 PM.  Learner: Patient  Readiness: Acceptance  Method: Demonstration  Response: Demonstrated Understanding    Mobility Training, taught by Bindu Mason PTA at 10/9/2024  2:28 PM.  Learner: Patient  Readiness: Acceptance  Method: Demonstration  Response: Demonstrated Understanding    Education Comments  No comments found.           Encounter Problems       Encounter Problems (Active)       PT Problem       PT Goal 1 STG - Pt will amb 100' using WW with SBA  (Progressing)       Start:  10/08/24    Expected End:  10/22/24            PT Goal 2 STG - Pt will transition supine <> sitting with MOD IND  (Progressing)       Start:  10/08/24    Expected End:  10/22/24            PT Goal 3 STG - Pt will transfer STS with MOD IND  (Progressing)       Start:  10/08/24    Expected End:  10/22/24            PT Goal 4 STG - Pt will SPT bed <> chair with SBA (Progressing)       Start:  10/08/24    Expected End:  10/22/24             PT Goal 5 STG - Pt will perform a B LE ther ex program of 2-3 sets of 10  (Progressing)       Start:  10/08/24    Expected End:  10/22/24               Pain - Adult

## 2024-10-09 NOTE — PROGRESS NOTES
POD 1    COMFORTABLE  DRESSING DRY  NV INTACT  HGB PENDING  STABLE  PT AND HOME WITH HHC IF SAFE

## 2024-10-09 NOTE — DISCHARGE INSTRUCTIONS
PAIN MANAGEMENT AT HOME:  To provide the best pain control over the next few days when pain will be at it's worst, we suggest you continue to take the following medications routinely and separately to provide the best pain management.  In addition, apply ice VERY FREQUENTLY to incision.   Also use some type of alternative intervention such as music therapy, relaxation techniques, or an type of diversion (such as watching television or talking to a friend) to help control pain.  Do NOT take more than 4000mg of acetaminophen (tylenol) in 24 hours.        CONSTIPATION MANAGEMENT AT HOME:  Constipation is common after Joint Replacement surgery for several reasons.  A common side effect of all pain medication is constipation.  A decrease in your activity as well as change in your normal diet & appetite all contribute to the constipation.  At home, it is important to take a daily stool softener such as Colace, Milk of magnesium or senokot while you are taking pain medications to help manage the constipation.  In addition, if you do NOT have a bowel movement within 72 hours of discharge, add a laxative such as miralax.  Miralax normally takes 2 to 3 days to work so you will not receive immediate results.  It is also important to drink plenty of fluids, especially water.  Stool softeners & laxatives need water to work properly.  We also suggest you increase your fiber intake either with foods high in fiber or with some type of supplement such as benefiber or metamucil.    Foods that are high in fiber include:     Fruits such as pears, strawberries, avocado, apples, raspberries, bananas, kiwis   Vegetables such as carrots, beets, broccoli, brussel sprouts, spinach   Lentils and kidney beans   Split peas and chickpeas   Oats, almonds, chaya seeds, and sweet potatoes      ACTIVITY AT HOME:  You will need to continue with your therapy after discharge either with in home therapy or at an outpatient facility.  Most patients  initially do in home therapy for about two weeks then transition to outpatient therapy.  You have freedom of choice in which in home therapy and outpatient facility you plan to use.  Most in home therapy sessions will begin within 24 to 48 hours after discharge.  After about two weeks of in home therapy, you will most likely to ready for outpatient therapy sessions.  Outpatient therapy is normally twice a week for weeks.  While you are at home it is important that you perform the exercises the therapist went over with you in the hospital 2 to 3 times a day.  After you complete your exercises, apply ice to your incision to help with swelling and pain.  You should also be getting up and walking around your house every hour with the use of your walker.  While at rest, perform, ankle pumps on each foot at least ten times.  This will help with the swelling as well as decrease your risk for blood clots.  ALWAYS USE YOUR WALKER WITH ANY TYPE OF ACTIVITY!!!!    WOUND CARE:  The bandage on your incision will stay in place for 7 days.  The bandage is waterproof so you may shower with the bandage in place.  No need to wrap or cover it.  Some drainage on your bandage is perfectly normal.  Home health care will assist you with bandage removal.  Once the bandage is removed, your incision can be left open to air if there is no drainage present.  If your incision is draining at the time of bandage removal, home health care will assist you with applying a new gauze bandage.  Gauze bandages are NOT waterproof.    Swelling in your operative extremity will peak about 72 hours after surgery and can last for weeks.  To help with the swelling make sure you are elevating your leg and apply ice frequently.  In addition, you will receive compression stockings upon discharge from the hospital.  Make sure you are wearing these stockings on both your legs at home.  Generally we instruct you to wear during the day and remove at bedtime for the  next 4 weeks.      *Apply ice to incision at least 5 times daily    *Wear bilateral eduardo hose stockings to lower extremities for next 4 weeks    *Do NOT take any non steroidal anti-inflammatory medications such as motrin, aleve, ibuprofen, celebrex or meloxicam    *Take daily stool softener.  If you experience any diarrhea, stop medication    *If you need a refill on your pain medication, contact your surgeon's office Monday through Thursday with as least 24 hours notice    If you have any questions or concerns please contact the Joint  Cheryl Ingram at:  Office: [phone]   Email:  [email or web address]

## 2024-10-10 ENCOUNTER — HOME CARE VISIT (OUTPATIENT)
Dept: HOME HEALTH SERVICES | Facility: HOME HEALTH | Age: 69
End: 2024-10-10
Payer: COMMERCIAL

## 2024-10-10 VITALS — DIASTOLIC BLOOD PRESSURE: 89 MMHG | SYSTOLIC BLOOD PRESSURE: 124 MMHG | HEART RATE: 69 BPM

## 2024-10-10 PROCEDURE — G0151 HHCP-SERV OF PT,EA 15 MIN: HCPCS | Mod: HHH

## 2024-10-10 PROCEDURE — 169592 NO-PAY CLAIM PROCEDURE

## 2024-10-10 SDOH — HEALTH STABILITY: PHYSICAL HEALTH: EXERCISE TYPE: SUPINE EXS X 15 REPS

## 2024-10-10 ASSESSMENT — ACTIVITIES OF DAILY LIVING (ADL)
ENTERING_EXITING_HOME: MINIMUM ASSIST
AMBULATION ASSISTANCE ON FLAT SURFACES: 1
OASIS_M1830: 05

## 2024-10-10 ASSESSMENT — ENCOUNTER SYMPTOMS
PAIN LOCATION: RIGHT KNEE
OCCASIONAL FEELINGS OF UNSTEADINESS: 0
PERSON REPORTING PAIN: PATIENT
LOWEST PAIN SEVERITY IN PAST 24 HOURS: 4/10
HIGHEST PAIN SEVERITY IN PAST 24 HOURS: 9/10
PAIN: 1

## 2024-10-10 NOTE — HOME HEALTH
patient seen for pt admission into [facility] services today following a r tkr on 10.8.  prior to this she walked with no device , indep with dressing and bathing but does not drive.  she lives with her grandson in a home where everything she needs is on the first floor and there is a ramp to exit home

## 2024-10-13 PROCEDURE — 99285 EMERGENCY DEPT VISIT HI MDM: CPT

## 2024-10-13 ASSESSMENT — PAIN - FUNCTIONAL ASSESSMENT: PAIN_FUNCTIONAL_ASSESSMENT: 0-10

## 2024-10-13 ASSESSMENT — PAIN SCALES - GENERAL: PAINLEVEL_OUTOF10: 3

## 2024-10-14 ENCOUNTER — APPOINTMENT (OUTPATIENT)
Dept: CARDIOLOGY | Facility: HOSPITAL | Age: 69
End: 2024-10-14
Payer: COMMERCIAL

## 2024-10-14 ENCOUNTER — HOME CARE VISIT (OUTPATIENT)
Dept: HOME HEALTH SERVICES | Facility: HOME HEALTH | Age: 69
End: 2024-10-14
Payer: COMMERCIAL

## 2024-10-14 ENCOUNTER — HOSPITAL ENCOUNTER (OUTPATIENT)
Facility: HOSPITAL | Age: 69
Setting detail: OBSERVATION
Discharge: HOME | End: 2024-10-15
Attending: EMERGENCY MEDICINE | Admitting: INTERNAL MEDICINE
Payer: COMMERCIAL

## 2024-10-14 ENCOUNTER — APPOINTMENT (OUTPATIENT)
Dept: RADIOLOGY | Facility: HOSPITAL | Age: 69
End: 2024-10-14
Payer: COMMERCIAL

## 2024-10-14 DIAGNOSIS — R40.4 TRANSIENT ALTERATION OF AWARENESS: ICD-10-CM

## 2024-10-14 DIAGNOSIS — Z96.651 S/P TOTAL KNEE ARTHROPLASTY, RIGHT: Primary | ICD-10-CM

## 2024-10-14 DIAGNOSIS — R55 SYNCOPE, UNSPECIFIED SYNCOPE TYPE: ICD-10-CM

## 2024-10-14 LAB
ALBUMIN SERPL BCP-MCNC: 3.6 G/DL (ref 3.4–5)
ALP SERPL-CCNC: 67 U/L (ref 33–136)
ALT SERPL W P-5'-P-CCNC: 9 U/L (ref 7–45)
AMPHETAMINES UR QL SCN: ABNORMAL
ANION GAP SERPL CALC-SCNC: 12 MMOL/L (ref 10–20)
APPEARANCE UR: CLEAR
AST SERPL W P-5'-P-CCNC: 19 U/L (ref 9–39)
BARBITURATES UR QL SCN: ABNORMAL
BASOPHILS # BLD AUTO: 0.05 X10*3/UL (ref 0–0.1)
BASOPHILS NFR BLD AUTO: 0.6 %
BENZODIAZ UR QL SCN: ABNORMAL
BILIRUB SERPL-MCNC: 0.7 MG/DL (ref 0–1.2)
BILIRUB UR STRIP.AUTO-MCNC: NEGATIVE MG/DL
BUN SERPL-MCNC: 14 MG/DL (ref 6–23)
BZE UR QL SCN: ABNORMAL
CALCIUM SERPL-MCNC: 9.3 MG/DL (ref 8.6–10.3)
CANNABINOIDS UR QL SCN: ABNORMAL
CARDIAC TROPONIN I PNL SERPL HS: 5 NG/L (ref 0–13)
CHLORIDE SERPL-SCNC: 104 MMOL/L (ref 98–107)
CO2 SERPL-SCNC: 28 MMOL/L (ref 21–32)
COLOR UR: COLORLESS
CREAT SERPL-MCNC: 0.67 MG/DL (ref 0.5–1.05)
EGFRCR SERPLBLD CKD-EPI 2021: >90 ML/MIN/1.73M*2
EOSINOPHIL # BLD AUTO: 0.1 X10*3/UL (ref 0–0.7)
EOSINOPHIL NFR BLD AUTO: 1.1 %
ERYTHROCYTE [DISTWIDTH] IN BLOOD BY AUTOMATED COUNT: 12.9 % (ref 11.5–14.5)
FENTANYL+NORFENTANYL UR QL SCN: ABNORMAL
GLUCOSE SERPL-MCNC: 103 MG/DL (ref 74–99)
GLUCOSE UR STRIP.AUTO-MCNC: NORMAL MG/DL
HCT VFR BLD AUTO: 32.5 % (ref 36–46)
HGB BLD-MCNC: 10.4 G/DL (ref 12–16)
HOLD SPECIMEN: NORMAL
IMM GRANULOCYTES # BLD AUTO: 0.09 X10*3/UL (ref 0–0.7)
IMM GRANULOCYTES NFR BLD AUTO: 1 % (ref 0–0.9)
KETONES UR STRIP.AUTO-MCNC: NEGATIVE MG/DL
LEUKOCYTE ESTERASE UR QL STRIP.AUTO: NEGATIVE
LYMPHOCYTES # BLD AUTO: 2.47 X10*3/UL (ref 1.2–4.8)
LYMPHOCYTES NFR BLD AUTO: 27.7 %
MCH RBC QN AUTO: 28.3 PG (ref 26–34)
MCHC RBC AUTO-ENTMCNC: 32 G/DL (ref 32–36)
MCV RBC AUTO: 88 FL (ref 80–100)
METHADONE UR QL SCN: ABNORMAL
MONOCYTES # BLD AUTO: 0.5 X10*3/UL (ref 0.1–1)
MONOCYTES NFR BLD AUTO: 5.6 %
NEUTROPHILS # BLD AUTO: 5.7 X10*3/UL (ref 1.2–7.7)
NEUTROPHILS NFR BLD AUTO: 64 %
NITRITE UR QL STRIP.AUTO: NEGATIVE
NRBC BLD-RTO: 0 /100 WBCS (ref 0–0)
OPIATES UR QL SCN: ABNORMAL
OXYCODONE+OXYMORPHONE UR QL SCN: ABNORMAL
PCP UR QL SCN: ABNORMAL
PH UR STRIP.AUTO: 6.5 [PH]
PLATELET # BLD AUTO: 328 X10*3/UL (ref 150–450)
POTASSIUM SERPL-SCNC: 4.3 MMOL/L (ref 3.5–5.3)
PROT SERPL-MCNC: 6.7 G/DL (ref 6.4–8.2)
PROT UR STRIP.AUTO-MCNC: NEGATIVE MG/DL
RBC # BLD AUTO: 3.68 X10*6/UL (ref 4–5.2)
RBC # UR STRIP.AUTO: NEGATIVE /UL
SODIUM SERPL-SCNC: 140 MMOL/L (ref 136–145)
SP GR UR STRIP.AUTO: 1
UROBILINOGEN UR STRIP.AUTO-MCNC: NORMAL MG/DL
WBC # BLD AUTO: 8.9 X10*3/UL (ref 4.4–11.3)

## 2024-10-14 PROCEDURE — 2500000001 HC RX 250 WO HCPCS SELF ADMINISTERED DRUGS (ALT 637 FOR MEDICARE OP): Performed by: EMERGENCY MEDICINE

## 2024-10-14 PROCEDURE — 80307 DRUG TEST PRSMV CHEM ANLYZR: CPT | Performed by: PHYSICIAN ASSISTANT

## 2024-10-14 PROCEDURE — 70450 CT HEAD/BRAIN W/O DYE: CPT

## 2024-10-14 PROCEDURE — G0378 HOSPITAL OBSERVATION PER HR: HCPCS

## 2024-10-14 PROCEDURE — 71275 CT ANGIOGRAPHY CHEST: CPT

## 2024-10-14 PROCEDURE — 80053 COMPREHEN METABOLIC PANEL: CPT | Performed by: PHYSICIAN ASSISTANT

## 2024-10-14 PROCEDURE — 2500000001 HC RX 250 WO HCPCS SELF ADMINISTERED DRUGS (ALT 637 FOR MEDICARE OP): Performed by: INTERNAL MEDICINE

## 2024-10-14 PROCEDURE — 81003 URINALYSIS AUTO W/O SCOPE: CPT | Mod: 59 | Performed by: PHYSICIAN ASSISTANT

## 2024-10-14 PROCEDURE — 2550000001 HC RX 255 CONTRASTS: Performed by: EMERGENCY MEDICINE

## 2024-10-14 PROCEDURE — 36415 COLL VENOUS BLD VENIPUNCTURE: CPT | Performed by: PHYSICIAN ASSISTANT

## 2024-10-14 PROCEDURE — 71045 X-RAY EXAM CHEST 1 VIEW: CPT

## 2024-10-14 PROCEDURE — 85025 COMPLETE CBC W/AUTO DIFF WBC: CPT | Performed by: PHYSICIAN ASSISTANT

## 2024-10-14 PROCEDURE — 71275 CT ANGIOGRAPHY CHEST: CPT | Performed by: RADIOLOGY

## 2024-10-14 PROCEDURE — 70450 CT HEAD/BRAIN W/O DYE: CPT | Performed by: RADIOLOGY

## 2024-10-14 PROCEDURE — 84484 ASSAY OF TROPONIN QUANT: CPT | Performed by: PHYSICIAN ASSISTANT

## 2024-10-14 PROCEDURE — 71045 X-RAY EXAM CHEST 1 VIEW: CPT | Performed by: RADIOLOGY

## 2024-10-14 PROCEDURE — 93005 ELECTROCARDIOGRAM TRACING: CPT

## 2024-10-14 RX ORDER — OXYCODONE AND ACETAMINOPHEN 5; 325 MG/1; MG/1
1 TABLET ORAL EVERY 6 HOURS PRN
Status: DISCONTINUED | OUTPATIENT
Start: 2024-10-14 | End: 2024-10-15 | Stop reason: HOSPADM

## 2024-10-14 RX ORDER — FERROUS SULFATE, DRIED 160(50) MG
1 TABLET, EXTENDED RELEASE ORAL 2 TIMES DAILY
COMMUNITY

## 2024-10-14 RX ORDER — ACETAMINOPHEN 325 MG/1
650 TABLET ORAL EVERY 6 HOURS SCHEDULED
Status: DISCONTINUED | OUTPATIENT
Start: 2024-10-14 | End: 2024-10-15 | Stop reason: HOSPADM

## 2024-10-14 RX ORDER — VIT C/E/ZN/COPPR/LUTEIN/ZEAXAN 250MG-90MG
25 CAPSULE ORAL DAILY
COMMUNITY

## 2024-10-14 RX ORDER — OXYCODONE AND ACETAMINOPHEN 5; 325 MG/1; MG/1
1 TABLET ORAL ONCE
Status: COMPLETED | OUTPATIENT
Start: 2024-10-14 | End: 2024-10-14

## 2024-10-14 RX ORDER — FERROUS SULFATE, DRIED 160(50) MG
1 TABLET, EXTENDED RELEASE ORAL 2 TIMES DAILY
Status: DISCONTINUED | OUTPATIENT
Start: 2024-10-14 | End: 2024-10-15 | Stop reason: HOSPADM

## 2024-10-14 RX ORDER — POLYETHYLENE GLYCOL 3350 17 G/17G
17 POWDER, FOR SOLUTION ORAL DAILY PRN
Status: DISCONTINUED | OUTPATIENT
Start: 2024-10-14 | End: 2024-10-15 | Stop reason: HOSPADM

## 2024-10-14 SDOH — SOCIAL STABILITY: SOCIAL INSECURITY: ARE THERE ANY APPARENT SIGNS OF INJURIES/BEHAVIORS THAT COULD BE RELATED TO ABUSE/NEGLECT?: NO

## 2024-10-14 SDOH — ECONOMIC STABILITY: FOOD INSECURITY: HOW HARD IS IT FOR YOU TO PAY FOR THE VERY BASICS LIKE FOOD, HOUSING, MEDICAL CARE, AND HEATING?: NOT VERY HARD

## 2024-10-14 SDOH — SOCIAL STABILITY: SOCIAL INSECURITY: HAVE YOU HAD THOUGHTS OF HARMING ANYONE ELSE?: NO

## 2024-10-14 SDOH — SOCIAL STABILITY: SOCIAL INSECURITY: WERE YOU ABLE TO COMPLETE ALL THE BEHAVIORAL HEALTH SCREENINGS?: YES

## 2024-10-14 SDOH — SOCIAL STABILITY: SOCIAL INSECURITY: ARE YOU OR HAVE YOU BEEN THREATENED OR ABUSED PHYSICALLY, EMOTIONALLY, OR SEXUALLY BY ANYONE?: NO

## 2024-10-14 SDOH — ECONOMIC STABILITY: FOOD INSECURITY: WITHIN THE PAST 12 MONTHS, THE FOOD YOU BOUGHT JUST DIDN'T LAST AND YOU DIDN'T HAVE MONEY TO GET MORE.: NEVER TRUE

## 2024-10-14 SDOH — SOCIAL STABILITY: SOCIAL INSECURITY: WITHIN THE LAST YEAR, HAVE YOU BEEN AFRAID OF YOUR PARTNER OR EX-PARTNER?: NO

## 2024-10-14 SDOH — SOCIAL STABILITY: SOCIAL INSECURITY: WITHIN THE LAST YEAR, HAVE YOU BEEN HUMILIATED OR EMOTIONALLY ABUSED IN OTHER WAYS BY YOUR PARTNER OR EX-PARTNER?: NO

## 2024-10-14 SDOH — ECONOMIC STABILITY: HOUSING INSECURITY: IN THE LAST 12 MONTHS, WAS THERE A TIME WHEN YOU WERE NOT ABLE TO PAY THE MORTGAGE OR RENT ON TIME?: NO

## 2024-10-14 SDOH — ECONOMIC STABILITY: HOUSING INSECURITY: AT ANY TIME IN THE PAST 12 MONTHS, WERE YOU HOMELESS OR LIVING IN A SHELTER (INCLUDING NOW)?: NO

## 2024-10-14 SDOH — SOCIAL STABILITY: SOCIAL INSECURITY: DO YOU FEEL ANYONE HAS EXPLOITED OR TAKEN ADVANTAGE OF YOU FINANCIALLY OR OF YOUR PERSONAL PROPERTY?: NO

## 2024-10-14 SDOH — ECONOMIC STABILITY: INCOME INSECURITY: IN THE PAST 12 MONTHS HAS THE ELECTRIC, GAS, OIL, OR WATER COMPANY THREATENED TO SHUT OFF SERVICES IN YOUR HOME?: NO

## 2024-10-14 SDOH — ECONOMIC STABILITY: FOOD INSECURITY: WITHIN THE PAST 12 MONTHS, YOU WORRIED THAT YOUR FOOD WOULD RUN OUT BEFORE YOU GOT THE MONEY TO BUY MORE.: NEVER TRUE

## 2024-10-14 SDOH — SOCIAL STABILITY: SOCIAL INSECURITY: DOES ANYONE TRY TO KEEP YOU FROM HAVING/CONTACTING OTHER FRIENDS OR DOING THINGS OUTSIDE YOUR HOME?: NO

## 2024-10-14 SDOH — ECONOMIC STABILITY: TRANSPORTATION INSECURITY: IN THE PAST 12 MONTHS, HAS LACK OF TRANSPORTATION KEPT YOU FROM MEDICAL APPOINTMENTS OR FROM GETTING MEDICATIONS?: NO

## 2024-10-14 SDOH — SOCIAL STABILITY: SOCIAL INSECURITY: DO YOU FEEL UNSAFE GOING BACK TO THE PLACE WHERE YOU ARE LIVING?: NO

## 2024-10-14 SDOH — SOCIAL STABILITY: SOCIAL INSECURITY: ABUSE: ADULT

## 2024-10-14 SDOH — ECONOMIC STABILITY: HOUSING INSECURITY: IN THE PAST 12 MONTHS, HOW MANY TIMES HAVE YOU MOVED WHERE YOU WERE LIVING?: 1

## 2024-10-14 SDOH — SOCIAL STABILITY: SOCIAL INSECURITY: HAS ANYONE EVER THREATENED TO HURT YOUR FAMILY OR YOUR PETS?: NO

## 2024-10-14 ASSESSMENT — PAIN - FUNCTIONAL ASSESSMENT
PAIN_FUNCTIONAL_ASSESSMENT: 0-10

## 2024-10-14 ASSESSMENT — ACTIVITIES OF DAILY LIVING (ADL)
TOILETING: NEEDS ASSISTANCE
HEARING - RIGHT EAR: FUNCTIONAL
ASSISTIVE_DEVICE: WALKER
WALKS IN HOME: NEEDS ASSISTANCE
BATHING: NEEDS ASSISTANCE
LACK_OF_TRANSPORTATION: NO
DRESSING YOURSELF: NEEDS ASSISTANCE
HEARING - LEFT EAR: FUNCTIONAL
GROOMING: INDEPENDENT
FEEDING YOURSELF: INDEPENDENT
JUDGMENT_ADEQUATE_SAFELY_COMPLETE_DAILY_ACTIVITIES: YES
ADEQUATE_TO_COMPLETE_ADL: YES
PATIENT'S MEMORY ADEQUATE TO SAFELY COMPLETE DAILY ACTIVITIES?: YES

## 2024-10-14 ASSESSMENT — LIFESTYLE VARIABLES
EVER FELT BAD OR GUILTY ABOUT YOUR DRINKING: NO
HOW OFTEN DO YOU HAVE A DRINK CONTAINING ALCOHOL: NEVER
TOTAL SCORE: 0
HOW OFTEN DO YOU HAVE 6 OR MORE DRINKS ON ONE OCCASION: NEVER
HOW MANY STANDARD DRINKS CONTAINING ALCOHOL DO YOU HAVE ON A TYPICAL DAY: PATIENT DOES NOT DRINK
EVER HAD A DRINK FIRST THING IN THE MORNING TO STEADY YOUR NERVES TO GET RID OF A HANGOVER: NO
SKIP TO QUESTIONS 9-10: 1
HAVE YOU EVER FELT YOU SHOULD CUT DOWN ON YOUR DRINKING: NO
AUDIT-C TOTAL SCORE: 0
HAVE PEOPLE ANNOYED YOU BY CRITICIZING YOUR DRINKING: NO
AUDIT-C TOTAL SCORE: 0

## 2024-10-14 ASSESSMENT — COGNITIVE AND FUNCTIONAL STATUS - GENERAL
HELP NEEDED FOR BATHING: A LITTLE
DRESSING REGULAR LOWER BODY CLOTHING: A LITTLE
TOILETING: A LITTLE
STANDING UP FROM CHAIR USING ARMS: A LITTLE
MOVING TO AND FROM BED TO CHAIR: A LITTLE
PATIENT BASELINE BEDBOUND: NO
CLIMB 3 TO 5 STEPS WITH RAILING: A LOT
STANDING UP FROM CHAIR USING ARMS: A LITTLE
CLIMB 3 TO 5 STEPS WITH RAILING: A LOT
WALKING IN HOSPITAL ROOM: A LITTLE
TURNING FROM BACK TO SIDE WHILE IN FLAT BAD: A LITTLE
HELP NEEDED FOR BATHING: A LITTLE
MOBILITY SCORE: 17
MOVING FROM LYING ON BACK TO SITTING ON SIDE OF FLAT BED WITH BEDRAILS: A LITTLE
DRESSING REGULAR UPPER BODY CLOTHING: A LITTLE
WALKING IN HOSPITAL ROOM: A LITTLE
MOVING TO AND FROM BED TO CHAIR: A LITTLE
MOBILITY SCORE: 18
DAILY ACTIVITIY SCORE: 20
DRESSING REGULAR UPPER BODY CLOTHING: A LITTLE
DRESSING REGULAR LOWER BODY CLOTHING: A LITTLE
TURNING FROM BACK TO SIDE WHILE IN FLAT BAD: A LITTLE
TOILETING: A LITTLE
DAILY ACTIVITIY SCORE: 20

## 2024-10-14 ASSESSMENT — PAIN DESCRIPTION - LOCATION
LOCATION: HIP
LOCATION: HIP
LOCATION: LEG

## 2024-10-14 ASSESSMENT — COLUMBIA-SUICIDE SEVERITY RATING SCALE - C-SSRS
6. HAVE YOU EVER DONE ANYTHING, STARTED TO DO ANYTHING, OR PREPARED TO DO ANYTHING TO END YOUR LIFE?: NO
2. HAVE YOU ACTUALLY HAD ANY THOUGHTS OF KILLING YOURSELF?: NO
1. IN THE PAST MONTH, HAVE YOU WISHED YOU WERE DEAD OR WISHED YOU COULD GO TO SLEEP AND NOT WAKE UP?: NO

## 2024-10-14 ASSESSMENT — PAIN SCALES - GENERAL
PAINLEVEL_OUTOF10: 6
PAINLEVEL_OUTOF10: 8
PAINLEVEL_OUTOF10: 0 - NO PAIN
PAINLEVEL_OUTOF10: 0 - NO PAIN
PAINLEVEL_OUTOF10: 8

## 2024-10-14 ASSESSMENT — PAIN DESCRIPTION - ORIENTATION
ORIENTATION: RIGHT

## 2024-10-14 ASSESSMENT — PAIN DESCRIPTION - PAIN TYPE: TYPE: ACUTE PAIN

## 2024-10-14 ASSESSMENT — PATIENT HEALTH QUESTIONNAIRE - PHQ9
1. LITTLE INTEREST OR PLEASURE IN DOING THINGS: NOT AT ALL
SUM OF ALL RESPONSES TO PHQ9 QUESTIONS 1 & 2: 0
2. FEELING DOWN, DEPRESSED OR HOPELESS: NOT AT ALL

## 2024-10-14 NOTE — CARE PLAN
Called answering service to clarify with Dr. Bourgeois his wishes in regards to patient care. Await response.

## 2024-10-14 NOTE — H&P
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

## 2024-10-14 NOTE — ED PROVIDER NOTES
HPI   No chief complaint on file.      68 year old female with severe osteoarthritis of right knee s/p right total knee replacement presents for a brief period of unresponsiveness.  I spoke with the patient along with the patient's son who provided the history of present illness.  It was reported around 1030 last evening the patient was laying in bed when she crying out for help.  The patient's grandson proceeded to check on her however she was found to be unresponsive.  There are no reports of cyanosis or apnea.  Patient just would not respond to arousable.  EMS was subsequently called.  Patient remained unresponsive to stimuli for approximately 15 minutes.  Patient and family deny any history of seizures.  There are no reports of incontinence.  The patient states feeling well at this time.  She is alert and oriented.  She denies chest pain or shortness of breath.  Reports of headache or acute visual change.  Family states no reported speech disturbance. On 10/8/24, patient underwent uncomplicated right total knee replacement by Dr Wilder Zamora. The patient was discharged on oxycodone which she states she has never taken before.               Patient History   Past Medical History:   Diagnosis Date    Arthritis     Disease of thyroid gland     Ex-smoker      Past Surgical History:   Procedure Laterality Date    ANTERIOR CRUCIATE LIGAMENT REPAIR      CARPAL TUNNEL RELEASE      EYE SURGERY      MENISCECTOMY       Family History   Problem Relation Name Age of Onset    Coronary artery disease Mother          heart stopped during surgery    Arthritis Mother      Coronary artery disease Father      Sleep apnea Father      Sleep apnea Brother      Prostate cancer Brother      Breast cancer Other Mat Aunt      Social History     Tobacco Use    Smoking status: Former     Current packs/day: 0.00     Types: Cigarettes     Quit date:      Years since quittin.8    Smokeless tobacco: Never   Substance Use Topics     Alcohol use: Not on file    Drug use: Not on file       Physical Exam   ED Triage Vitals [10/13/24 0043]   Temperature Heart Rate Respirations BP   36.6 °C (97.9 °F) 70 18 172/72      Pulse Ox Temp Source Heart Rate Source Patient Position   98 % Temporal -- --      BP Location FiO2 (%)     -- --       Physical Exam  Vitals and nursing note reviewed.   Constitutional:       General: She is not in acute distress.     Appearance: Normal appearance. She is normal weight. She is not ill-appearing, toxic-appearing or diaphoretic.   HENT:      Head: Normocephalic.      Nose: Nose normal.      Mouth/Throat:      Mouth: Mucous membranes are moist.   Eyes:      Extraocular Movements: Extraocular movements intact.      Conjunctiva/sclera: Conjunctivae normal.   Cardiovascular:      Rate and Rhythm: Normal rate and regular rhythm.      Pulses: Normal pulses.   Pulmonary:      Effort: Pulmonary effort is normal. No respiratory distress.      Breath sounds: Normal breath sounds.   Abdominal:      General: Abdomen is flat. There is no distension.      Palpations: Abdomen is soft.      Tenderness: There is no abdominal tenderness. There is no guarding or rebound.   Musculoskeletal:         General: Normal range of motion.      Cervical back: Normal range of motion and neck supple.   Skin:     General: Skin is warm and dry.      Capillary Refill: Capillary refill takes less than 2 seconds.   Neurological:      General: No focal deficit present.      Mental Status: She is alert and oriented to person, place, and time.   Psychiatric:         Mood and Affect: Mood normal.         Behavior: Behavior normal.         Thought Content: Thought content normal.         Judgment: Judgment normal.           ED Course & MDM   ED Course as of 10/14/24 0350   Mon Oct 14, 2024   0216 IMPRESSION:  No acute intracranial hemorrhage or mass effect.      Mild nonspecific white matter changes.   [DS]   0217 IMPRESSION:  1.  No focal consolidation or  pleural effusion.   [DS]      ED Course User Index  [DS] Carlos Amaya PA-C         Diagnoses as of 10/14/24 0350   S/P total knee arthroplasty, right   Transient alteration of awareness                 No data recorded                                 Medical Decision Making  68-year-old female seen and evaluated for a brief period of unresponsiveness.  Patient recently had a right total knee replacement and was subsequently placed on oxycodone.  Patient states that she has never had that medication before.  She reports that she took her medication as prescribed.  Family reports that the patient is currently at her baseline mental status.  On exam the patient was found to have no evidence of lateralizing deficit.  Workup was obtained and found to be without significant abnormality.  CT noncontrast imaging of the head revealed no evidence of acute pathology.  The patient was found to have nonspecific white matter changes.  Chest x-ray negative.  Differentials were discussed at length.  These included TIA versus arrhythmia versus opiate overdose.  Patient is currently alert and oriented with no lateralizing deficit.  Recommended admission for observation given the period of unresponsiveness.  She was agreeable to this.        Procedure  Procedures     Carlos Amaya PA-C  10/14/24 0350

## 2024-10-14 NOTE — PROGRESS NOTES
Pharmacy Medication History Review    Diane Fleischer is a 68 y.o. female admitted for S/P total knee arthroplasty, right. Pharmacy reviewed the patient's [id number] medications and allergies for accuracy.    The list below reflectives the updated PTA list. Please review each medication in order reconciliation for additional clarification and justification.  Prior to Admission medications    Medication Sig Start Date End Date Authorizing Provider   acetaminophen (Tylenol) 325 mg tablet Take 2 tablets (650 mg) by mouth every 6 hours. 10/9/24  NERY Rachel   apixaban (Eliquis) 2.5 mg tablet Take 1 tablet (2.5 mg) by mouth every 12 hours. 10/9/24 11/8/24 NERY Rachel   calcium carbonate-vitamin D3 500 mg-5 mcg (200 unit) tablet Take 1 tablet by mouth 2 times a day.   Historical Provider, MD   cholecalciferol (Vitamin D-3) 25 MCG (1000 UT) capsule Take 1 capsule (25 mcg) by mouth once daily.   Historical Provider, MD   meclizine (Antivert) 25 mg tablet Take 1 tablet (25 mg) by mouth 3 times a day as needed for dizziness.   Historical Provider, MD   oxyCODONE (Roxicodone) 5 mg immediate release tablet Take 1-2 tablets (5-10 mg) by mouth every 6 hours if needed for severe pain (7 - 10) for up to 7 days. 10/9/24 10/16/24 NERY Rachel   polyethylene glycol (Glycolax, Miralax) 17 gram packet Take 17 g by mouth once daily as needed (take dose if you do NOT have bowel movement by Saturday Oct 12th). 10/9/24  NERY Rachel        The list below reflectives the updated allergy list. Please review each documented allergy for additional clarification and justification.  Allergies  Reviewed by Shanita Syed on 10/14/2024        Severity Reactions Comments    Hydrocodone Low Dizziness     Latex Low Rash             Below are additional concerns with the patient's PTA list.      Shanita Syed

## 2024-10-14 NOTE — ED TRIAGE NOTES
Pt brought in via ems stating pt has a knee surgery on the 8th/10 ans was placed on oxy for pain management, but has been feeling very weak every time she took the medication. That is why she is here for observation

## 2024-10-15 ENCOUNTER — HOME CARE VISIT (OUTPATIENT)
Dept: HOME HEALTH SERVICES | Facility: HOME HEALTH | Age: 69
End: 2024-10-15
Payer: COMMERCIAL

## 2024-10-15 VITALS
RESPIRATION RATE: 18 BRPM | OXYGEN SATURATION: 97 % | TEMPERATURE: 96.6 F | HEIGHT: 63 IN | HEART RATE: 61 BPM | WEIGHT: 242.51 LBS | DIASTOLIC BLOOD PRESSURE: 62 MMHG | SYSTOLIC BLOOD PRESSURE: 139 MMHG | BODY MASS INDEX: 42.97 KG/M2

## 2024-10-15 LAB
ATRIAL RATE: 72 BPM
LABORATORY COMMENT REPORT: NORMAL
P AXIS: 88 DEGREES
PATH REPORT.FINAL DX SPEC: NORMAL
PATH REPORT.GROSS SPEC: NORMAL
PATH REPORT.RELEVANT HX SPEC: NORMAL
PATH REPORT.TOTAL CANCER: NORMAL
PR INTERVAL: 162 MS
Q ONSET: 249 MS
QRS COUNT: 12 BEATS
QRS DURATION: 98 MS
QT INTERVAL: 391 MS
QTC CALCULATION(BAZETT): 428 MS
QTC FREDERICIA: 415 MS
R AXIS: -14 DEGREES
T AXIS: 21 DEGREES
T OFFSET: 445 MS
VENTRICULAR RATE: 72 BPM

## 2024-10-15 PROCEDURE — G0378 HOSPITAL OBSERVATION PER HR: HCPCS

## 2024-10-15 PROCEDURE — 2500000001 HC RX 250 WO HCPCS SELF ADMINISTERED DRUGS (ALT 637 FOR MEDICARE OP): Performed by: INTERNAL MEDICINE

## 2024-10-15 PROCEDURE — G0157 HHC PT ASSISTANT EA 15: HCPCS | Mod: CQ,HHH

## 2024-10-15 ASSESSMENT — COGNITIVE AND FUNCTIONAL STATUS - GENERAL
CLIMB 3 TO 5 STEPS WITH RAILING: A LITTLE
DAILY ACTIVITIY SCORE: 20
DRESSING REGULAR LOWER BODY CLOTHING: A LITTLE
TOILETING: A LITTLE
HELP NEEDED FOR BATHING: A LITTLE
MOVING TO AND FROM BED TO CHAIR: A LITTLE
STANDING UP FROM CHAIR USING ARMS: A LITTLE
MOBILITY SCORE: 20
DRESSING REGULAR UPPER BODY CLOTHING: A LITTLE
WALKING IN HOSPITAL ROOM: A LITTLE

## 2024-10-15 ASSESSMENT — PAIN SCALES - GENERAL
PAINLEVEL_OUTOF10: 0 - NO PAIN
PAINLEVEL_OUTOF10: 0 - NO PAIN
PAINLEVEL_OUTOF10: 7
PAINLEVEL_OUTOF10: 4
PAINLEVEL_OUTOF10: 8

## 2024-10-15 ASSESSMENT — PAIN - FUNCTIONAL ASSESSMENT
PAIN_FUNCTIONAL_ASSESSMENT: 0-10

## 2024-10-15 ASSESSMENT — PAIN DESCRIPTION - ORIENTATION: ORIENTATION: RIGHT

## 2024-10-15 ASSESSMENT — PAIN DESCRIPTION - LOCATION: LOCATION: KNEE

## 2024-10-15 NOTE — CARE PLAN
The patient's goals for the shift include      The clinical goals for the shift include Pt will be safe and comfortable throughout the shift      Problem: Pain - Adult  Goal: Verbalizes/displays adequate comfort level or baseline comfort level  Outcome: Progressing     Problem: Safety - Adult  Goal: Free from fall injury  Outcome: Progressing     Problem: Discharge Planning  Goal: Discharge to home or other facility with appropriate resources  Outcome: Progressing     Problem: Chronic Conditions and Co-morbidities  Goal: Patient's chronic conditions and co-morbidity symptoms are monitored and maintained or improved  Outcome: Progressing

## 2024-10-15 NOTE — PROGRESS NOTES
Face to face with  this morning states patient is ok to discharge home. Will discharge home per 's note on 10/14 to discharge home when cleared by Cardiology.  Discharge order in at this time.

## 2024-10-15 NOTE — CONSULTS
Cardiology Consult    Impression:  Syncope.  Sounds vagally mediated.  Recent elective TKA.  Plan:  Okay for discharge  Follow-up PCP  CC  Syncope  HPI:  68-year-old woman brought to hospital after she fainted.  She was discharged a week ago after an elective total knee arthroplasty.  Surgery went well.  Postop course has been unremarkable.  She has been ambulating with her walker.  Pain control adequate.  While in bed yesterday she suddenly felt very clammy her knee was sore she got lightheaded and fainted.  According to family she was unresponsive for a minute or 2.  They were concerned and brought her to hospital.  EKG was normal.  Blood pressure and heart rate were okay.  She has been observed overnight and has remained asymptomatic.  No prior history of syncope.  No recent illnesses.  Meds:  Scheduled medications  acetaminophen, 650 mg, oral, q6h ODILON  apixaban, 2.5 mg, oral, q12h ODILON  calcium carbonate-vitamin D3, 1 tablet, oral, BID      Continuous medications     PRN medications  PRN medications: oxyCODONE-acetaminophen, polyethylene glycol    PMHx:  As listed above.  Social history:  Lives with family.  Family history:  Noncontributory  Review of systems:  See HPI  Physical exam:  Vitals:    10/15/24 0726   BP: 147/67   Pulse: 53   Resp:    Temp: 36.5 °C (97.7 °F)   SpO2: 98%      JVP not elevated. Carotid upstroke normal. No carotid bruits. Heart sounds normal. No extra sounds or murmurs. Chest clear. Abdomen soft, nontender. No masses. Peripheral pulses palpable. No edema.  EKG:  Normal sinus rhythm  Echo:  No results found for this or any previous visit.   Labs:  Lab Results   Component Value Date    WBC 8.9 10/14/2024    HGB 10.4 (L) 10/14/2024    HCT 32.5 (L) 10/14/2024     10/14/2024    CHOL 183 05/27/2023    TRIG 83 05/27/2023    HDL 50.6 05/27/2023    ALT 9 10/14/2024    AST 19 10/14/2024     10/14/2024    K 4.3 10/14/2024     10/14/2024    CREATININE 0.67 10/14/2024    BUN 14  10/14/2024    CO2 28 10/14/2024    TSH 3.23 11/29/2022    INR 0.9 09/25/2024     par

## 2024-10-15 NOTE — CARE PLAN
The patient's goals for the shift include      The clinical goals for the shift include Pt will be safe and comfortable throughout the shift      Problem: Pain - Adult  Goal: Verbalizes/displays adequate comfort level or baseline comfort level  10/15/2024 1234 by Cassandra Curtis RN  Outcome: Adequate for Discharge  10/15/2024 1232 by Cassandra Curtis RN  Outcome: Progressing     Problem: Safety - Adult  Goal: Free from fall injury  10/15/2024 1234 by Cassandra Curtis RN  Outcome: Adequate for Discharge  10/15/2024 1232 by Cassandra Curtis RN  Outcome: Progressing     Problem: Discharge Planning  Goal: Discharge to home or other facility with appropriate resources  10/15/2024 1234 by Cassandra Curtis RN  Outcome: Adequate for Discharge  10/15/2024 1232 by Cassandra Curtis RN  Outcome: Progressing     Problem: Chronic Conditions and Co-morbidities  Goal: Patient's chronic conditions and co-morbidity symptoms are monitored and maintained or improved  10/15/2024 1234 by Cassandra Curtis RN  Outcome: Adequate for Discharge  10/15/2024 1232 by Cassandra Curtis RN  Outcome: Progressing

## 2024-10-16 ASSESSMENT — ENCOUNTER SYMPTOMS
PAIN SEVERITY GOAL: 0/10
PAIN: 1
SUBJECTIVE PAIN PROGRESSION: WAXING AND WANING
PERSON REPORTING PAIN: PATIENT
LOWEST PAIN SEVERITY IN PAST 24 HOURS: 5/10
PAIN LOCATION: RIGHT KNEE
HIGHEST PAIN SEVERITY IN PAST 24 HOURS: 8/10

## 2024-10-17 ENCOUNTER — HOME CARE VISIT (OUTPATIENT)
Dept: HOME HEALTH SERVICES | Facility: HOME HEALTH | Age: 69
End: 2024-10-17
Payer: COMMERCIAL

## 2024-10-17 PROCEDURE — G0157 HHC PT ASSISTANT EA 15: HCPCS | Mod: CQ,HHH

## 2024-10-17 ASSESSMENT — ENCOUNTER SYMPTOMS
HIGHEST PAIN SEVERITY IN PAST 24 HOURS: 7/10
LOWEST PAIN SEVERITY IN PAST 24 HOURS: 4/10
PERSON REPORTING PAIN: PATIENT
PAIN: 1
SUBJECTIVE PAIN PROGRESSION: WAXING AND WANING
PAIN LOCATION: RIGHT KNEE
PAIN SEVERITY GOAL: 0/10

## 2024-10-21 ENCOUNTER — HOME CARE VISIT (OUTPATIENT)
Dept: HOME HEALTH SERVICES | Facility: HOME HEALTH | Age: 69
End: 2024-10-21
Payer: COMMERCIAL

## 2024-10-21 PROCEDURE — G0157 HHC PT ASSISTANT EA 15: HCPCS | Mod: CQ,HHH

## 2024-10-21 ASSESSMENT — ENCOUNTER SYMPTOMS
PAIN LOCATION: RIGHT KNEE
PAIN SEVERITY GOAL: 0/10
PAIN: 1
LOWEST PAIN SEVERITY IN PAST 24 HOURS: 6/10
HIGHEST PAIN SEVERITY IN PAST 24 HOURS: 8/10
PERSON REPORTING PAIN: PATIENT
SUBJECTIVE PAIN PROGRESSION: WAXING AND WANING

## 2024-10-23 ENCOUNTER — HOME CARE VISIT (OUTPATIENT)
Dept: HOME HEALTH SERVICES | Facility: HOME HEALTH | Age: 69
End: 2024-10-23
Payer: COMMERCIAL

## 2024-10-23 PROCEDURE — G0157 HHC PT ASSISTANT EA 15: HCPCS | Mod: CQ,HHH

## 2024-10-23 ASSESSMENT — ENCOUNTER SYMPTOMS
HIGHEST PAIN SEVERITY IN PAST 24 HOURS: 7/10
PERSON REPORTING PAIN: PATIENT
SUBJECTIVE PAIN PROGRESSION: WAXING AND WANING
PAIN: 1
PAIN SEVERITY GOAL: 0/10
LOWEST PAIN SEVERITY IN PAST 24 HOURS: 5/10
PAIN LOCATION: RIGHT KNEE

## 2024-10-24 ENCOUNTER — HOME CARE VISIT (OUTPATIENT)
Dept: HOME HEALTH SERVICES | Facility: HOME HEALTH | Age: 69
End: 2024-10-24
Payer: COMMERCIAL

## 2024-10-24 PROCEDURE — G0151 HHCP-SERV OF PT,EA 15 MIN: HCPCS | Mod: HHH

## 2024-10-24 SDOH — HEALTH STABILITY: PHYSICAL HEALTH: EXERCISE TYPE: TKR EX PROTOCOL

## 2024-10-24 ASSESSMENT — ENCOUNTER SYMPTOMS
PAIN LOCATION: RIGHT KNEE
LOWEST PAIN SEVERITY IN PAST 24 HOURS: 3/10
OCCASIONAL FEELINGS OF UNSTEADINESS: 0
PERSON REPORTING PAIN: PATIENT
PAIN: 1
HIGHEST PAIN SEVERITY IN PAST 24 HOURS: 7/10

## 2024-10-24 ASSESSMENT — ACTIVITIES OF DAILY LIVING (ADL)
AMBULATION ASSISTANCE ON FLAT SURFACES: 1
OASIS_M1830: 04
HOME_HEALTH_OASIS: 00

## 2024-10-24 NOTE — HOME HEALTH
patient seen for agency [place] today.  she states she is happt with her [facility] services and the care that was provided.  she denies any questions re her hep .  she agrees with [place] today.

## 2025-06-16 ENCOUNTER — HOSPITAL ENCOUNTER (OUTPATIENT)
Dept: RADIOLOGY | Facility: CLINIC | Age: 70
Discharge: HOME | End: 2025-06-16
Payer: COMMERCIAL

## 2025-06-16 VITALS — WEIGHT: 242 LBS | HEIGHT: 63 IN | BODY MASS INDEX: 42.88 KG/M2

## 2025-06-16 DIAGNOSIS — Z12.31 ENCOUNTER FOR SCREENING MAMMOGRAM FOR MALIGNANT NEOPLASM OF BREAST: ICD-10-CM

## 2025-06-16 PROCEDURE — 77067 SCR MAMMO BI INCL CAD: CPT

## 2025-06-16 PROCEDURE — 77067 SCR MAMMO BI INCL CAD: CPT | Performed by: RADIOLOGY

## 2025-06-16 PROCEDURE — 77063 BREAST TOMOSYNTHESIS BI: CPT | Performed by: RADIOLOGY

## (undated) DEVICE — DRESSING, MEPILEX BORDER, POST-OP AG, 4 X 14 IN

## (undated) DEVICE — CEMENT, MIXEVAC III, 10S BOWL, KNEES

## (undated) DEVICE — WOUND SYSTEM, DEBRIDEMENT & CLEANING, O.R DUOPAK

## (undated) DEVICE — NEEDLE, SPINAL, QUINCKE, 18 G X 3.5 IN, PINK HUB

## (undated) DEVICE — CEMENT, BONE, CMW 1, 40 GM

## (undated) DEVICE — BASIN KIT, SINGLE

## (undated) DEVICE — BLADE, SAGITTAL DUAL CUT, 25 X 90 X 1.27

## (undated) DEVICE — SLEEVE, VASO PRESS, CALF GARMENT, MEDIUM, GREEN

## (undated) DEVICE — SEALER, BIPOLAR, AQUA MANTYS 6.0

## (undated) DEVICE — TUBING, IRRIGATION, HIGH FLOW, HAND PIECE SET

## (undated) DEVICE — BLADE, SAW, RECIPROCATING, DOUBLE SIDED, THIN, STAINLESS STEEL

## (undated) DEVICE — SPONGE, LAP, XRAY DECT, 18IN X 18IN, W/MASTER DMT, STERILE

## (undated) DEVICE — STAPLER, SKIN, MULTIFIRE, PREMIUM, WIDE, 35 W

## (undated) DEVICE — DRAPE, SHEET, THREE QUARTER, FAN FOLD, 57 X 77 IN

## (undated) DEVICE — DRAPE, U-DRAPE, NON STERILE

## (undated) DEVICE — SYRINGE, 50 CC, LUER LOCK

## (undated) DEVICE — Device

## (undated) DEVICE — TIP, SUCTION, SUPER SUCKER, KAM, MINI, CURVED